# Patient Record
Sex: FEMALE | Race: WHITE | NOT HISPANIC OR LATINO | URBAN - METROPOLITAN AREA
[De-identification: names, ages, dates, MRNs, and addresses within clinical notes are randomized per-mention and may not be internally consistent; named-entity substitution may affect disease eponyms.]

---

## 2017-01-06 ENCOUNTER — EMERGENCY (EMERGENCY)
Facility: HOSPITAL | Age: 42
LOS: 1 days | Discharge: HOME | End: 2017-01-06
Attending: INTERNAL MEDICINE | Admitting: EMERGENCY MEDICINE
Payer: COMMERCIAL

## 2017-01-06 VITALS
RESPIRATION RATE: 16 BRPM | WEIGHT: 156.97 LBS | OXYGEN SATURATION: 100 % | SYSTOLIC BLOOD PRESSURE: 119 MMHG | TEMPERATURE: 98 F | DIASTOLIC BLOOD PRESSURE: 81 MMHG | HEART RATE: 80 BPM | HEIGHT: 65 IN

## 2017-01-06 VITALS
SYSTOLIC BLOOD PRESSURE: 144 MMHG | TEMPERATURE: 98 F | RESPIRATION RATE: 17 BRPM | OXYGEN SATURATION: 98 % | DIASTOLIC BLOOD PRESSURE: 99 MMHG | HEART RATE: 73 BPM

## 2017-01-06 DIAGNOSIS — Z88.5 ALLERGY STATUS TO NARCOTIC AGENT: ICD-10-CM

## 2017-01-06 DIAGNOSIS — M54.5 LOW BACK PAIN: ICD-10-CM

## 2017-01-06 DIAGNOSIS — Z98.89 OTHER SPECIFIED POSTPROCEDURAL STATES: Chronic | ICD-10-CM

## 2017-01-06 DIAGNOSIS — D56.3 THALASSEMIA MINOR: ICD-10-CM

## 2017-01-06 DIAGNOSIS — M54.30 SCIATICA, UNSPECIFIED SIDE: ICD-10-CM

## 2017-01-06 PROCEDURE — 99283 EMERGENCY DEPT VISIT LOW MDM: CPT

## 2017-01-06 PROCEDURE — 99283 EMERGENCY DEPT VISIT LOW MDM: CPT | Mod: 25

## 2017-01-06 RX ADMIN — Medication 60 MILLIGRAM(S): at 01:39

## 2017-01-06 NOTE — ED PROVIDER NOTE - CONSTITUTIONAL, MLM
normal... Well appearing, well nourished, awake, alert, oriented to person, place, time/situation and in mild to moderate distress.

## 2017-01-06 NOTE — ED ADULT NURSE NOTE - NS ED NURSE DC INFO COMPLEXITY
Returned Demonstration/Simple: Patient demonstrates quick and easy understanding/Verbalized Understanding

## 2017-01-06 NOTE — ED ADULT NURSE NOTE - OBJECTIVE STATEMENT
patient states she was working out at the gym a few days ago and starting yesterday she developed pain to left buttock radiating down leg, worse with sitting and laying down and better when walking. No visible injury, patient denies injuring trauma to leg or body.

## 2017-01-06 NOTE — ED ADULT NURSE REASSESSMENT NOTE - NS ED NURSE REASSESS COMMENT FT1
patient states pain is the same but she wants to go home and rest at home while waiting for medication to take effect. Patient able to ambulate without difficulty, does not want any pain meds now, "I will take ibuprofen at home". D/C to home with daughter at this time.

## 2017-01-06 NOTE — ED ADULT TRIAGE NOTE - CHIEF COMPLAINT QUOTE
left lower back pain that radiates down leg since yesterday left lower back pain that radiates down leg since yesterday after working out at the gym

## 2017-01-16 ENCOUNTER — RESULT REVIEW (OUTPATIENT)
Age: 42
End: 2017-01-16

## 2017-09-29 ENCOUNTER — EMERGENCY (EMERGENCY)
Facility: HOSPITAL | Age: 42
LOS: 1 days | Discharge: HOME | End: 2017-09-29
Attending: EMERGENCY MEDICINE | Admitting: EMERGENCY MEDICINE
Payer: COMMERCIAL

## 2017-09-29 VITALS
HEART RATE: 88 BPM | OXYGEN SATURATION: 99 % | TEMPERATURE: 98 F | SYSTOLIC BLOOD PRESSURE: 142 MMHG | DIASTOLIC BLOOD PRESSURE: 89 MMHG | RESPIRATION RATE: 16 BRPM

## 2017-09-29 DIAGNOSIS — Z90.49 ACQUIRED ABSENCE OF OTHER SPECIFIED PARTS OF DIGESTIVE TRACT: Chronic | ICD-10-CM

## 2017-09-29 DIAGNOSIS — Z98.89 OTHER SPECIFIED POSTPROCEDURAL STATES: Chronic | ICD-10-CM

## 2017-09-29 PROCEDURE — 64400 NJX AA&/STRD TRIGEMINAL NRV: CPT | Mod: LT

## 2017-09-29 PROCEDURE — 64400 NJX AA&/STRD TRIGEMINAL NRV: CPT | Mod: 26,LT

## 2017-09-29 PROCEDURE — 99283 EMERGENCY DEPT VISIT LOW MDM: CPT | Mod: 25

## 2017-09-29 RX ORDER — ACETAMINOPHEN 500 MG
650 TABLET ORAL ONCE
Qty: 0 | Refills: 0 | Status: COMPLETED | OUTPATIENT
Start: 2017-09-29 | End: 2017-09-29

## 2017-09-29 RX ADMIN — Medication 650 MILLIGRAM(S): at 01:49

## 2017-09-29 NOTE — ED PROVIDER NOTE - PLAN OF CARE
1) Please follow-up with your primary care doctor within the next 3 days. You may also follow up with the dental clinic ([phone]) if unable to get an earlier appointment with your dentist. Please call today or tomorrow for an appointment.  If you cannot follow-up with your doctor(s), please return to the ED for any urgent issues.  2) If you have any worsening of symptoms or any other concerns please return to the ED immediately.  3) Please continue taking your home medications as directed.

## 2017-09-29 NOTE — ED ADULT NURSE NOTE - CHPI ED SYMPTOMS NEG
no fever/no ear pain/no headache/no bleeding gums/no decreased eating/drinking/no mouth sores/no nasal congestion

## 2017-09-29 NOTE — ED PROCEDURE NOTE - ATTENDING CONTRIBUTION TO CARE
I was physically present for the E/M service provided. I was physically present for the key portions of the service provided. MARCIE.

## 2017-09-29 NOTE — ED PROVIDER NOTE - OBJECTIVE STATEMENT
41y  Female currently 20 weeks pregnant PMH anemia and PSH appendectomy complaining of dental pain/injury. Having pain in the Left upper jaw, planning to get a root canal around 10/24. Has been taking tylenol for pain. No pus. Denies fevers, chills, nausea, vomiting, diarrhea, constipation, chest pain, or dyspnea. Able to swallow without difficulty. 41y  Female currently 20 weeks pregnant PMH anemia and PSH appendectomy complaining of dental pain/injury. Having pain in the Left upper jaw, planning to get a root canal around 10/24. Has been taking tylenol for pain. No pus. Denies fevers, chills, nausea, vomiting, diarrhea, constipation, chest pain, or dyspnea. Able to swallow without difficulty. No obvious abscess. 41y  Female currently 20 weeks pregnant PMH anemia and PSH appendectomy complaining of dental pain/injury. Having pain in the Left upper jaw for the past two days, planning to get a root canal around 10/24. Has been taking tylenol for pain. No pus. Denies fevers, chills, nausea, vomiting, diarrhea, constipation, chest pain, or dyspnea. Able to swallow without difficulty. No obvious abscess. 41y  Female currently 20 weeks pregnant PMH anemia and PSH appendectomy complaining of dental pain/injury. Having pain in the Left upper jaw for the past two days, planning to get a root canal around 10/24. Has been taking tylenol for pain. No pus. Denies fevers, chills, nausea, vomiting, diarrhea, constipation, chest pain, or dyspnea. Able to swallow without difficulty. No obvious abscess. Does not want opioid medications due to side effects (sedation and potential risk to baby).

## 2017-09-29 NOTE — ED PROVIDER NOTE - ATTENDING CONTRIBUTION TO CARE
I was physically present for the E/M service provided. I agree with above history, physical, and plan which I have reviewed and edited where appropriate. I was physically present for the key portions of the service provided.    41y  Female currently 20 weeks pregnant p/w dental pain (#15) has root canal scheduled. Taking Tylenol. No fever. No vaginal bleeding. No pelvic pain. . Afebrile. Tolerates PO. No drooling. Oropharynx clear, no gum fluctuance. Pt does not tolerate opioids. Dental nerve block performed per note.

## 2017-09-29 NOTE — ED PROVIDER NOTE - CARE PLAN
Principal Discharge DX:	Tooth ache  Instructions for follow-up, activity and diet:	1) Please follow-up with your primary care doctor within the next 3 days. You may also follow up with the dental clinic ([phone]) if unable to get an earlier appointment with your dentist. Please call today or tomorrow for an appointment.  If you cannot follow-up with your doctor(s), please return to the ED for any urgent issues.  2) If you have any worsening of symptoms or any other concerns please return to the ED immediately.  3) Please continue taking your home medications as directed.

## 2017-09-29 NOTE — ED PROVIDER NOTE - PHYSICAL EXAMINATION
PE: CONSTITUTIONAL: Well appearing, well nourished, in no apparent distress. ENMT: Airway patent, nasal mucosa clear, mouth with normal mucosa, TTP of left upper jaw tooth #15 (left upper molar), no abscess visualized or palpated. HEAD: NCAT EYES: PERRL, EOMI bilaterally CARDIAC: RRR, no m/r/g, no pedal edema RESPIRATORY: CTA bilaterally, no adventitious sounds GI: Abdomen non-distended, non-tender, +gravid uterus MSK: Spine appears normal, range of motion is not limited, no muscle/joint tenderness NEURO: CNII-XII grossly intact, 5/5 strength, full sensation all extremities, gait intact SKIN: Skin tone normal in color, warm and dry. No evidence of rash.

## 2017-09-29 NOTE — ED ADULT NURSE NOTE - OBJECTIVE STATEMENT
41 year old female presents to the ED complaining of left upper jaw pain. As per patient she has a root canal scheduled for 10/24 and has been taking tylenol for the pain. Pt is 20 weeks pregnant . Pt is A&O x 4, VSS, afebrile, ambulating independently. Pt denies fever, chills, NVD, SOB, or chest pain. Bed in low position,  at bedside.

## 2017-09-29 NOTE — ED PROVIDER NOTE - PROGRESS NOTE DETAILS
FHR of 160bpm by doppler.  - Luis A Zhang MD Patient feeling improved, will discharge home.  - Luis A Zhang MD

## 2017-10-06 ENCOUNTER — ASOB RESULT (OUTPATIENT)
Age: 42
End: 2017-10-06

## 2017-10-06 ENCOUNTER — APPOINTMENT (OUTPATIENT)
Dept: ANTEPARTUM | Facility: CLINIC | Age: 42
End: 2017-10-06
Payer: COMMERCIAL

## 2017-10-06 PROCEDURE — 76811 OB US DETAILED SNGL FETUS: CPT

## 2017-12-01 ENCOUNTER — APPOINTMENT (OUTPATIENT)
Dept: ANTEPARTUM | Facility: CLINIC | Age: 42
End: 2017-12-01
Payer: COMMERCIAL

## 2017-12-01 ENCOUNTER — ASOB RESULT (OUTPATIENT)
Age: 42
End: 2017-12-01

## 2017-12-01 ENCOUNTER — OUTPATIENT (OUTPATIENT)
Dept: OUTPATIENT SERVICES | Facility: HOSPITAL | Age: 42
LOS: 1 days | End: 2017-12-01
Payer: COMMERCIAL

## 2017-12-01 DIAGNOSIS — Z01.818 ENCOUNTER FOR OTHER PREPROCEDURAL EXAMINATION: ICD-10-CM

## 2017-12-01 DIAGNOSIS — O09.293 SUPERVISION OF PREGNANCY WITH OTHER POOR REPRODUCTIVE OR OBSTETRIC HISTORY, THIRD TRIMESTER: ICD-10-CM

## 2017-12-01 DIAGNOSIS — Z90.49 ACQUIRED ABSENCE OF OTHER SPECIFIED PARTS OF DIGESTIVE TRACT: Chronic | ICD-10-CM

## 2017-12-01 DIAGNOSIS — O09.523 SUPERVISION OF ELDERLY MULTIGRAVIDA, THIRD TRIMESTER: ICD-10-CM

## 2017-12-01 DIAGNOSIS — D56.3 THALASSEMIA MINOR: ICD-10-CM

## 2017-12-01 DIAGNOSIS — O34.13 MATERNAL CARE FOR BENIGN TUMOR OF CORPUS UTERI, THIRD TRIMESTER: ICD-10-CM

## 2017-12-01 DIAGNOSIS — Z98.89 OTHER SPECIFIED POSTPROCEDURAL STATES: Chronic | ICD-10-CM

## 2017-12-01 DIAGNOSIS — O44.43 LOW LYING PLACENTA NOS OR WITHOUT HEMORRHAGE, THIRD TRIMESTER: ICD-10-CM

## 2017-12-01 PROCEDURE — 76816 OB US FOLLOW-UP PER FETUS: CPT | Mod: 26

## 2017-12-01 PROCEDURE — 59025 FETAL NON-STRESS TEST: CPT | Mod: 26

## 2017-12-01 PROCEDURE — 59025 FETAL NON-STRESS TEST: CPT

## 2017-12-01 PROCEDURE — 76816 OB US FOLLOW-UP PER FETUS: CPT

## 2017-12-28 ENCOUNTER — ASOB RESULT (OUTPATIENT)
Age: 42
End: 2017-12-28

## 2017-12-28 ENCOUNTER — APPOINTMENT (OUTPATIENT)
Dept: ANTEPARTUM | Facility: CLINIC | Age: 42
End: 2017-12-28
Payer: COMMERCIAL

## 2017-12-28 PROCEDURE — 76816 OB US FOLLOW-UP PER FETUS: CPT

## 2017-12-28 PROCEDURE — 76817 TRANSVAGINAL US OBSTETRIC: CPT

## 2018-01-11 ENCOUNTER — APPOINTMENT (OUTPATIENT)
Dept: ANTEPARTUM | Facility: CLINIC | Age: 43
End: 2018-01-11
Payer: COMMERCIAL

## 2018-01-11 ENCOUNTER — ASOB RESULT (OUTPATIENT)
Age: 43
End: 2018-01-11

## 2018-01-11 PROCEDURE — 76817 TRANSVAGINAL US OBSTETRIC: CPT

## 2018-01-11 PROCEDURE — 76816 OB US FOLLOW-UP PER FETUS: CPT

## 2018-01-12 ENCOUNTER — RESULT REVIEW (OUTPATIENT)
Age: 43
End: 2018-01-12

## 2018-01-13 ENCOUNTER — INPATIENT (INPATIENT)
Facility: HOSPITAL | Age: 43
LOS: 2 days | Discharge: HOME | End: 2018-01-16
Attending: SPECIALIST | Admitting: SPECIALIST
Payer: COMMERCIAL

## 2018-01-13 ENCOUNTER — EMERGENCY (EMERGENCY)
Facility: HOSPITAL | Age: 43
LOS: 1 days | Discharge: STILL ADMITTED OR ADMINISTRATIVE | End: 2018-01-13
Admitting: EMERGENCY MEDICINE

## 2018-01-13 ENCOUNTER — TRANSCRIPTION ENCOUNTER (OUTPATIENT)
Age: 43
End: 2018-01-13

## 2018-01-13 VITALS — WEIGHT: 171.96 LBS | HEIGHT: 65 IN

## 2018-01-13 VITALS
TEMPERATURE: 98 F | DIASTOLIC BLOOD PRESSURE: 84 MMHG | RESPIRATION RATE: 16 BRPM | HEART RATE: 95 BPM | SYSTOLIC BLOOD PRESSURE: 156 MMHG | OXYGEN SATURATION: 100 %

## 2018-01-13 DIAGNOSIS — Z98.89 OTHER SPECIFIED POSTPROCEDURAL STATES: Chronic | ICD-10-CM

## 2018-01-13 DIAGNOSIS — O26.899 OTHER SPECIFIED PREGNANCY RELATED CONDITIONS, UNSPECIFIED TRIMESTER: ICD-10-CM

## 2018-01-13 DIAGNOSIS — Z90.49 ACQUIRED ABSENCE OF OTHER SPECIFIED PARTS OF DIGESTIVE TRACT: Chronic | ICD-10-CM

## 2018-01-13 LAB
ALBUMIN SERPL ELPH-MCNC: 3.7 G/DL — SIGNIFICANT CHANGE UP (ref 3.3–5)
ALP SERPL-CCNC: 123 U/L — HIGH (ref 40–120)
ALT FLD-CCNC: 13 U/L — SIGNIFICANT CHANGE UP (ref 4–33)
ANISOCYTOSIS BLD QL: SLIGHT — SIGNIFICANT CHANGE UP
APPEARANCE UR: CLEAR — SIGNIFICANT CHANGE UP
APTT BLD: 34.3 SEC — SIGNIFICANT CHANGE UP (ref 27.5–37.4)
AST SERPL-CCNC: 16 U/L — SIGNIFICANT CHANGE UP (ref 4–32)
BACTERIA # UR AUTO: SIGNIFICANT CHANGE UP
BASOPHILS # BLD AUTO: 0.02 K/UL — SIGNIFICANT CHANGE UP (ref 0–0.2)
BASOPHILS NFR BLD AUTO: 0.3 % — SIGNIFICANT CHANGE UP (ref 0–2)
BILIRUB SERPL-MCNC: 0.3 MG/DL — SIGNIFICANT CHANGE UP (ref 0.2–1.2)
BILIRUB UR-MCNC: NEGATIVE — SIGNIFICANT CHANGE UP
BLD GP AB SCN SERPL QL: NEGATIVE — SIGNIFICANT CHANGE UP
BLOOD UR QL VISUAL: NEGATIVE — SIGNIFICANT CHANGE UP
BUN SERPL-MCNC: 6 MG/DL — LOW (ref 7–23)
BURR CELLS BLD QL SMEAR: PRESENT — SIGNIFICANT CHANGE UP
CALCIUM SERPL-MCNC: 8.9 MG/DL — SIGNIFICANT CHANGE UP (ref 8.4–10.5)
CHLORIDE SERPL-SCNC: 104 MMOL/L — SIGNIFICANT CHANGE UP (ref 98–107)
CO2 SERPL-SCNC: 21 MMOL/L — LOW (ref 22–31)
COLOR SPEC: YELLOW — SIGNIFICANT CHANGE UP
CREAT SERPL-MCNC: 0.45 MG/DL — LOW (ref 0.5–1.3)
DACRYOCYTES BLD QL SMEAR: SLIGHT — SIGNIFICANT CHANGE UP
EOSINOPHIL # BLD AUTO: 0.13 K/UL — SIGNIFICANT CHANGE UP (ref 0–0.5)
EOSINOPHIL NFR BLD AUTO: 1.8 % — SIGNIFICANT CHANGE UP (ref 0–6)
FIBRINOGEN PPP-MCNC: 605 MG/DL — HIGH (ref 310–510)
GIANT PLATELETS BLD QL SMEAR: PRESENT — SIGNIFICANT CHANGE UP
GLUCOSE SERPL-MCNC: 117 MG/DL — HIGH (ref 70–99)
GLUCOSE UR-MCNC: NEGATIVE — SIGNIFICANT CHANGE UP
HBV SURFACE AG SER-ACNC: NEGATIVE — SIGNIFICANT CHANGE UP
HCT VFR BLD CALC: 23.5 % — LOW (ref 34.5–45)
HCT VFR BLD CALC: 26.1 % — LOW (ref 34.5–45)
HCT VFR BLD CALC: 28.5 % — LOW (ref 34.5–45)
HGB BLD-MCNC: 7.4 G/DL — LOW (ref 11.5–15.5)
HGB BLD-MCNC: 8.5 G/DL — LOW (ref 11.5–15.5)
HGB BLD-MCNC: 9.5 G/DL — LOW (ref 11.5–15.5)
HYALINE CASTS # UR AUTO: SIGNIFICANT CHANGE UP (ref 0–?)
IMM GRANULOCYTES # BLD AUTO: 0.12 # — SIGNIFICANT CHANGE UP
IMM GRANULOCYTES NFR BLD AUTO: 1.6 % — HIGH (ref 0–1.5)
INR BLD: 0.96 — SIGNIFICANT CHANGE UP (ref 0.88–1.17)
KETONES UR-MCNC: NEGATIVE — SIGNIFICANT CHANGE UP
LDH SERPL L TO P-CCNC: 170 U/L — SIGNIFICANT CHANGE UP (ref 135–225)
LEUKOCYTE ESTERASE UR-ACNC: HIGH
LYMPHOCYTES # BLD AUTO: 1.01 K/UL — SIGNIFICANT CHANGE UP (ref 1–3.3)
LYMPHOCYTES # BLD AUTO: 13.6 % — SIGNIFICANT CHANGE UP (ref 13–44)
MACROCYTES BLD QL: SLIGHT — SIGNIFICANT CHANGE UP
MANUAL SMEAR VERIFICATION: SIGNIFICANT CHANGE UP
MCHC RBC-ENTMCNC: 31.5 % — LOW (ref 32–36)
MCHC RBC-ENTMCNC: 31.7 PG — SIGNIFICANT CHANGE UP (ref 27–34)
MCHC RBC-ENTMCNC: 31.9 PG — SIGNIFICANT CHANGE UP (ref 27–34)
MCHC RBC-ENTMCNC: 32.6 % — SIGNIFICANT CHANGE UP (ref 32–36)
MCHC RBC-ENTMCNC: 32.6 PG — SIGNIFICANT CHANGE UP (ref 27–34)
MCHC RBC-ENTMCNC: 33.3 % — SIGNIFICANT CHANGE UP (ref 32–36)
MCV RBC AUTO: 101.3 FL — HIGH (ref 80–100)
MCV RBC AUTO: 97.4 FL — SIGNIFICANT CHANGE UP (ref 80–100)
MCV RBC AUTO: 97.9 FL — SIGNIFICANT CHANGE UP (ref 80–100)
MONOCYTES # BLD AUTO: 0.51 K/UL — SIGNIFICANT CHANGE UP (ref 0–0.9)
MONOCYTES NFR BLD AUTO: 6.9 % — SIGNIFICANT CHANGE UP (ref 2–14)
MUCOUS THREADS # UR AUTO: SIGNIFICANT CHANGE UP
NEUTROPHILS # BLD AUTO: 5.61 K/UL — SIGNIFICANT CHANGE UP (ref 1.8–7.4)
NEUTROPHILS NFR BLD AUTO: 75.8 % — SIGNIFICANT CHANGE UP (ref 43–77)
NITRITE UR-MCNC: NEGATIVE — SIGNIFICANT CHANGE UP
NON-SQ EPI CELLS # UR AUTO: <1 — SIGNIFICANT CHANGE UP
NRBC # FLD: 0 — SIGNIFICANT CHANGE UP
PH UR: 6.5 — SIGNIFICANT CHANGE UP (ref 4.6–8)
PLATELET # BLD AUTO: 100 K/UL — LOW (ref 150–400)
PLATELET # BLD AUTO: 134 K/UL — LOW (ref 150–400)
PLATELET # BLD AUTO: 98 K/UL — LOW (ref 150–400)
PLATELET COUNT - ESTIMATE: SIGNIFICANT CHANGE UP
PMV BLD: 9.4 FL — SIGNIFICANT CHANGE UP (ref 7–13)
PMV BLD: 9.4 FL — SIGNIFICANT CHANGE UP (ref 7–13)
PMV BLD: 9.7 FL — SIGNIFICANT CHANGE UP (ref 7–13)
POLYCHROMASIA BLD QL SMEAR: SLIGHT — SIGNIFICANT CHANGE UP
POTASSIUM SERPL-MCNC: 4.1 MMOL/L — SIGNIFICANT CHANGE UP (ref 3.5–5.3)
POTASSIUM SERPL-SCNC: 4.1 MMOL/L — SIGNIFICANT CHANGE UP (ref 3.5–5.3)
PROT SERPL-MCNC: 6.8 G/DL — SIGNIFICANT CHANGE UP (ref 6–8.3)
PROT UR-MCNC: 10 MG/DL — SIGNIFICANT CHANGE UP
PROTHROM AB SERPL-ACNC: 10.6 SEC — SIGNIFICANT CHANGE UP (ref 9.8–13.1)
RBC # BLD: 2.32 M/UL — LOW (ref 3.8–5.2)
RBC # BLD: 2.68 M/UL — LOW (ref 3.8–5.2)
RBC # BLD: 2.91 M/UL — LOW (ref 3.8–5.2)
RBC # FLD: 14.4 % — SIGNIFICANT CHANGE UP (ref 10.3–14.5)
RBC CASTS # UR COMP ASSIST: SIGNIFICANT CHANGE UP (ref 0–?)
RH IG SCN BLD-IMP: POSITIVE — SIGNIFICANT CHANGE UP
RUBV IGG SER-ACNC: 10.6 INDEX — SIGNIFICANT CHANGE UP
RUBV IGG SER-IMP: POSITIVE — SIGNIFICANT CHANGE UP
SODIUM SERPL-SCNC: 139 MMOL/L — SIGNIFICANT CHANGE UP (ref 135–145)
SP GR SPEC: 1.02 — SIGNIFICANT CHANGE UP (ref 1–1.04)
SQUAMOUS # UR AUTO: SIGNIFICANT CHANGE UP
T PALLIDUM AB TITR SER: NEGATIVE — SIGNIFICANT CHANGE UP
URATE SERPL-MCNC: 3.9 MG/DL — SIGNIFICANT CHANGE UP (ref 2.5–7)
UROBILINOGEN FLD QL: NORMAL MG/DL — SIGNIFICANT CHANGE UP
WBC # BLD: 7.4 K/UL — SIGNIFICANT CHANGE UP (ref 3.8–10.5)
WBC # BLD: 8.96 K/UL — SIGNIFICANT CHANGE UP (ref 3.8–10.5)
WBC # BLD: 9.38 K/UL — SIGNIFICANT CHANGE UP (ref 3.8–10.5)
WBC # FLD AUTO: 7.4 K/UL — SIGNIFICANT CHANGE UP (ref 3.8–10.5)
WBC # FLD AUTO: 8.96 K/UL — SIGNIFICANT CHANGE UP (ref 3.8–10.5)
WBC # FLD AUTO: 9.38 K/UL — SIGNIFICANT CHANGE UP (ref 3.8–10.5)
WBC UR QL: SIGNIFICANT CHANGE UP (ref 0–?)

## 2018-01-13 PROCEDURE — 88307 TISSUE EXAM BY PATHOLOGIST: CPT | Mod: 26

## 2018-01-13 RX ORDER — SODIUM CHLORIDE 9 MG/ML
1000 INJECTION, SOLUTION INTRAVENOUS ONCE
Qty: 0 | Refills: 0 | Status: COMPLETED | OUTPATIENT
Start: 2018-01-13 | End: 2018-01-13

## 2018-01-13 RX ORDER — DOCUSATE SODIUM 100 MG
100 CAPSULE ORAL
Qty: 0 | Refills: 0 | Status: DISCONTINUED | OUTPATIENT
Start: 2018-01-13 | End: 2018-01-15

## 2018-01-13 RX ORDER — TETANUS TOXOID, REDUCED DIPHTHERIA TOXOID AND ACELLULAR PERTUSSIS VACCINE, ADSORBED 5; 2.5; 8; 8; 2.5 [IU]/.5ML; [IU]/.5ML; UG/.5ML; UG/.5ML; UG/.5ML
0.5 SUSPENSION INTRAMUSCULAR ONCE
Qty: 0 | Refills: 0 | Status: COMPLETED | OUTPATIENT
Start: 2018-01-13

## 2018-01-13 RX ORDER — HEPARIN SODIUM 5000 [USP'U]/ML
5000 INJECTION INTRAVENOUS; SUBCUTANEOUS EVERY 12 HOURS
Qty: 0 | Refills: 0 | Status: DISCONTINUED | OUTPATIENT
Start: 2018-01-13 | End: 2018-01-16

## 2018-01-13 RX ORDER — GLYCERIN ADULT
1 SUPPOSITORY, RECTAL RECTAL AT BEDTIME
Qty: 0 | Refills: 0 | Status: DISCONTINUED | OUTPATIENT
Start: 2018-01-13 | End: 2018-01-16

## 2018-01-13 RX ORDER — OXYTOCIN 10 UNIT/ML
333.33 VIAL (ML) INJECTION
Qty: 20 | Refills: 0 | Status: COMPLETED | OUTPATIENT
Start: 2018-01-13 | End: 2018-01-13

## 2018-01-13 RX ORDER — SODIUM CHLORIDE 9 MG/ML
500 INJECTION, SOLUTION INTRAVENOUS ONCE
Qty: 0 | Refills: 0 | Status: COMPLETED | OUTPATIENT
Start: 2018-01-13 | End: 2018-01-13

## 2018-01-13 RX ORDER — IBUPROFEN 200 MG
1 TABLET ORAL
Qty: 0 | Refills: 0 | COMMUNITY
Start: 2018-01-13

## 2018-01-13 RX ORDER — FERROUS SULFATE 325(65) MG
325 TABLET ORAL DAILY
Qty: 0 | Refills: 0 | Status: DISCONTINUED | OUTPATIENT
Start: 2018-01-13 | End: 2018-01-15

## 2018-01-13 RX ORDER — OXYCODONE HYDROCHLORIDE 5 MG/1
5 TABLET ORAL
Qty: 0 | Refills: 0 | Status: DISCONTINUED | OUTPATIENT
Start: 2018-01-13 | End: 2018-01-13

## 2018-01-13 RX ORDER — SODIUM CHLORIDE 9 MG/ML
1000 INJECTION, SOLUTION INTRAVENOUS
Qty: 0 | Refills: 0 | Status: DISCONTINUED | OUTPATIENT
Start: 2018-01-13 | End: 2018-01-14

## 2018-01-13 RX ORDER — DIPHENHYDRAMINE HCL 50 MG
25 CAPSULE ORAL EVERY 6 HOURS
Qty: 0 | Refills: 0 | Status: DISCONTINUED | OUTPATIENT
Start: 2018-01-13 | End: 2018-01-16

## 2018-01-13 RX ORDER — ACETAMINOPHEN 500 MG
3 TABLET ORAL
Qty: 0 | Refills: 0 | COMMUNITY
Start: 2018-01-13

## 2018-01-13 RX ORDER — INFLUENZA VIRUS VACCINE 15; 15; 15; 15 UG/.5ML; UG/.5ML; UG/.5ML; UG/.5ML
0.5 SUSPENSION INTRAMUSCULAR ONCE
Qty: 0 | Refills: 0 | Status: DISCONTINUED | OUTPATIENT
Start: 2018-01-13 | End: 2018-01-16

## 2018-01-13 RX ORDER — SODIUM CHLORIDE 9 MG/ML
1000 INJECTION, SOLUTION INTRAVENOUS
Qty: 0 | Refills: 0 | Status: DISCONTINUED | OUTPATIENT
Start: 2018-01-13 | End: 2018-01-13

## 2018-01-13 RX ORDER — ONDANSETRON 8 MG/1
4 TABLET, FILM COATED ORAL ONCE
Qty: 0 | Refills: 0 | Status: COMPLETED | OUTPATIENT
Start: 2018-01-13 | End: 2018-01-13

## 2018-01-13 RX ORDER — ACETAMINOPHEN 500 MG
975 TABLET ORAL EVERY 6 HOURS
Qty: 0 | Refills: 0 | Status: DISCONTINUED | OUTPATIENT
Start: 2018-01-13 | End: 2018-01-16

## 2018-01-13 RX ORDER — KETOROLAC TROMETHAMINE 30 MG/ML
30 SYRINGE (ML) INJECTION EVERY 6 HOURS
Qty: 0 | Refills: 0 | Status: DISCONTINUED | OUTPATIENT
Start: 2018-01-13 | End: 2018-01-14

## 2018-01-13 RX ORDER — LANOLIN
1 OINTMENT (GRAM) TOPICAL
Qty: 0 | Refills: 0 | Status: DISCONTINUED | OUTPATIENT
Start: 2018-01-13 | End: 2018-01-16

## 2018-01-13 RX ORDER — ACETAMINOPHEN 500 MG
3 TABLET ORAL
Qty: 0 | Refills: 0 | DISCHARGE
Start: 2018-01-13

## 2018-01-13 RX ORDER — OXYTOCIN 10 UNIT/ML
41.67 VIAL (ML) INJECTION
Qty: 20 | Refills: 0 | Status: DISCONTINUED | OUTPATIENT
Start: 2018-01-13 | End: 2018-01-13

## 2018-01-13 RX ORDER — FAMOTIDINE 10 MG/ML
20 INJECTION INTRAVENOUS ONCE
Qty: 0 | Refills: 0 | Status: COMPLETED | OUTPATIENT
Start: 2018-01-13 | End: 2018-01-13

## 2018-01-13 RX ORDER — CITRIC ACID/SODIUM CITRATE 300-500 MG
30 SOLUTION, ORAL ORAL ONCE
Qty: 0 | Refills: 0 | Status: COMPLETED | OUTPATIENT
Start: 2018-01-13 | End: 2018-01-13

## 2018-01-13 RX ORDER — SIMETHICONE 80 MG/1
80 TABLET, CHEWABLE ORAL EVERY 4 HOURS
Qty: 0 | Refills: 0 | Status: DISCONTINUED | OUTPATIENT
Start: 2018-01-13 | End: 2018-01-16

## 2018-01-13 RX ORDER — OXYCODONE HYDROCHLORIDE 5 MG/1
5 TABLET ORAL EVERY 4 HOURS
Qty: 0 | Refills: 0 | Status: DISCONTINUED | OUTPATIENT
Start: 2018-01-13 | End: 2018-01-13

## 2018-01-13 RX ORDER — METOCLOPRAMIDE HCL 10 MG
10 TABLET ORAL ONCE
Qty: 0 | Refills: 0 | Status: COMPLETED | OUTPATIENT
Start: 2018-01-13 | End: 2018-01-13

## 2018-01-13 RX ORDER — IBUPROFEN 200 MG
600 TABLET ORAL EVERY 6 HOURS
Qty: 0 | Refills: 0 | Status: COMPLETED | OUTPATIENT
Start: 2018-01-13 | End: 2018-12-12

## 2018-01-13 RX ADMIN — Medication 30 MILLIGRAM(S): at 14:42

## 2018-01-13 RX ADMIN — SODIUM CHLORIDE 1000 MILLILITER(S): 9 INJECTION, SOLUTION INTRAVENOUS at 14:01

## 2018-01-13 RX ADMIN — SODIUM CHLORIDE 125 MILLILITER(S): 9 INJECTION, SOLUTION INTRAVENOUS at 12:16

## 2018-01-13 RX ADMIN — SODIUM CHLORIDE 125 MILLILITER(S): 9 INJECTION, SOLUTION INTRAVENOUS at 02:35

## 2018-01-13 RX ADMIN — Medication 30 MILLIGRAM(S): at 20:30

## 2018-01-13 RX ADMIN — HEPARIN SODIUM 5000 UNIT(S): 5000 INJECTION INTRAVENOUS; SUBCUTANEOUS at 13:05

## 2018-01-13 RX ADMIN — Medication 125 MILLIUNIT(S)/MIN: at 05:08

## 2018-01-13 RX ADMIN — Medication 12 MILLIGRAM(S): at 02:35

## 2018-01-13 RX ADMIN — Medication 1000 MILLIUNIT(S)/MIN: at 05:08

## 2018-01-13 RX ADMIN — SODIUM CHLORIDE 2000 MILLILITER(S): 9 INJECTION, SOLUTION INTRAVENOUS at 02:35

## 2018-01-13 RX ADMIN — Medication 30 MILLILITER(S): at 03:20

## 2018-01-13 RX ADMIN — FAMOTIDINE 20 MILLIGRAM(S): 10 INJECTION INTRAVENOUS at 02:35

## 2018-01-13 RX ADMIN — ONDANSETRON 4 MILLIGRAM(S): 8 TABLET, FILM COATED ORAL at 09:30

## 2018-01-13 RX ADMIN — Medication 30 MILLIGRAM(S): at 21:00

## 2018-01-13 RX ADMIN — Medication 975 MILLIGRAM(S): at 13:47

## 2018-01-13 RX ADMIN — SODIUM CHLORIDE 2000 MILLILITER(S): 9 INJECTION, SOLUTION INTRAVENOUS at 11:10

## 2018-01-13 RX ADMIN — Medication 125 MILLIUNIT(S)/MIN: at 05:28

## 2018-01-13 RX ADMIN — Medication 10 MILLIGRAM(S): at 02:35

## 2018-01-13 RX ADMIN — Medication 975 MILLIGRAM(S): at 14:42

## 2018-01-13 RX ADMIN — ONDANSETRON 4 MILLIGRAM(S): 8 TABLET, FILM COATED ORAL at 17:38

## 2018-01-13 RX ADMIN — Medication 125 MILLIUNIT(S)/MIN: at 05:27

## 2018-01-13 RX ADMIN — Medication 30 MILLIGRAM(S): at 15:15

## 2018-01-13 NOTE — DISCHARGE NOTE OB - CARE PROVIDER_API CALL
José Miguel Payne), Obstetrics and Gynecology  [address] [place] [address]  Phone: [phone]  Fax: [phone]

## 2018-01-13 NOTE — PROVIDER CONTACT NOTE (OTHER) - BACKGROUND
Pt s/p c/s for Placenta Previa. Urine output for 9am hour 30. OB Resident notified. Monitoring continued. Adequate output for pt is 39mL/hr. Next hour, Urine output was 10. OB Resident notifed.

## 2018-01-13 NOTE — DISCHARGE NOTE OB - CARE PLAN
Principal Discharge DX:	 delivery delivered  Goal:	RECOVERY  Instructions for follow-up, activity and diet:	REGULAR DIET  AMBULATION ENCOURAGED Principal Discharge DX:	 delivery delivered  Goal:	RECOVERY  Assessment and plan of treatment:	REGULAR DIET  AMBULATION ENCOURAGED

## 2018-01-13 NOTE — DISCHARGE NOTE OB - MEDICATION SUMMARY - MEDICATIONS TO STOP TAKING
I will STOP taking the medications listed below when I get home from the hospital:  None I will STOP taking the medications listed below when I get home from the hospital:    Medrol Dosepak 4 mg oral tablet  -- 1 tab(s) by mouth once a day  Use as directed  -- It is very important that you take or use this exactly as directed.  Do not skip doses or discontinue unless directed by your doctor.  Obtain medical advice before taking any non-prescription drugs as some may affect the action of this medication.  Take with food or milk.    Naprosyn 500 mg oral tablet  -- 1 tab(s) by mouth 2 times a day  -- Check with your doctor before becoming pregnant.  May cause drowsiness or dizziness.  Obtain medical advice before taking any non-prescription drugs as some may affect the action of this medication.  Take with food or milk.    Medrol Dosepak 4 mg oral tablet  -- 1 dose(s) by mouth once a day  -- It is very important that you take or use this exactly as directed.  Do not skip doses or discontinue unless directed by your doctor.  Obtain medical advice before taking any non-prescription drugs as some may affect the action of this medication.  Take with food or milk.

## 2018-01-13 NOTE — DISCHARGE NOTE OB - PATIENT PORTAL LINK FT
“You can access the FollowHealth Patient Portal, offered by [facility], by registering with the following website: [email or web address]”

## 2018-01-13 NOTE — ED ADULT TRIAGE NOTE - CHIEF COMPLAINT QUOTE
Pt 34 weeks pregnant spotting for the past two months with vaginal pain and pressure in the abdomen since 8pm associated with tightening, pt fourth pregnancy  scheduled for a   secondary to "placenta previa" with dr Elmer sequeira&JARED called, pt going to L&D

## 2018-01-13 NOTE — DISCHARGE NOTE OB - MEDICATION SUMMARY - MEDICATIONS TO TAKE
I will START or STAY ON the medications listed below when I get home from the hospital:    acetaminophen 325 mg oral tablet  -- 3 tab(s) by mouth every 6 hours  -- Indication: For  delivery delivered    ibuprofen 600 mg oral tablet  -- 1 tab(s) by mouth every 6 hours  -- Indication: For  delivery delivered    Iron Chews 15 mg oral tablet, chewable  -- 1 tab(s) by mouth once a day  -- Indication: For Other pregnancy-related conditions, antepartum I will START or STAY ON the medications listed below when I get home from the hospital:    acetaminophen 325 mg oral tablet  -- 3 tab(s) by mouth every 6 hours, As Needed  -- Indication: For moderate pain    Iron Chews 15 mg oral tablet, chewable  -- 1 tab(s) by mouth once a day  -- Indication: For anemia

## 2018-01-14 ENCOUNTER — TRANSCRIPTION ENCOUNTER (OUTPATIENT)
Age: 43
End: 2018-01-14

## 2018-01-14 LAB
BASOPHILS # BLD AUTO: 0.01 K/UL — SIGNIFICANT CHANGE UP (ref 0–0.2)
BASOPHILS NFR BLD AUTO: 0.1 % — SIGNIFICANT CHANGE UP (ref 0–2)
EOSINOPHIL # BLD AUTO: 0.02 K/UL — SIGNIFICANT CHANGE UP (ref 0–0.5)
EOSINOPHIL NFR BLD AUTO: 0.2 % — SIGNIFICANT CHANGE UP (ref 0–6)
HCT VFR BLD CALC: 20 % — CRITICAL LOW (ref 34.5–45)
HCT VFR BLD CALC: 24.1 % — LOW (ref 34.5–45)
HGB BLD-MCNC: 6.6 G/DL — CRITICAL LOW (ref 11.5–15.5)
HGB BLD-MCNC: 7.7 G/DL — LOW (ref 11.5–15.5)
IMM GRANULOCYTES # BLD AUTO: 0.06 # — SIGNIFICANT CHANGE UP
IMM GRANULOCYTES NFR BLD AUTO: 0.7 % — SIGNIFICANT CHANGE UP (ref 0–1.5)
LYMPHOCYTES # BLD AUTO: 0.67 K/UL — LOW (ref 1–3.3)
LYMPHOCYTES # BLD AUTO: 8.2 % — LOW (ref 13–44)
MCHC RBC-ENTMCNC: 31.4 PG — SIGNIFICANT CHANGE UP (ref 27–34)
MCHC RBC-ENTMCNC: 32 % — SIGNIFICANT CHANGE UP (ref 32–36)
MCHC RBC-ENTMCNC: 32.8 PG — SIGNIFICANT CHANGE UP (ref 27–34)
MCHC RBC-ENTMCNC: 33 % — SIGNIFICANT CHANGE UP (ref 32–36)
MCV RBC AUTO: 98.4 FL — SIGNIFICANT CHANGE UP (ref 80–100)
MCV RBC AUTO: 99.5 FL — SIGNIFICANT CHANGE UP (ref 80–100)
MONOCYTES # BLD AUTO: 0.55 K/UL — SIGNIFICANT CHANGE UP (ref 0–0.9)
MONOCYTES NFR BLD AUTO: 6.7 % — SIGNIFICANT CHANGE UP (ref 2–14)
NEUTROPHILS # BLD AUTO: 6.89 K/UL — SIGNIFICANT CHANGE UP (ref 1.8–7.4)
NEUTROPHILS NFR BLD AUTO: 84.1 % — HIGH (ref 43–77)
NRBC # FLD: 0 — SIGNIFICANT CHANGE UP
NRBC # FLD: 0.02 — SIGNIFICANT CHANGE UP
PLATELET # BLD AUTO: 112 K/UL — LOW (ref 150–400)
PLATELET # BLD AUTO: 115 K/UL — LOW (ref 150–400)
PMV BLD: 10.1 FL — SIGNIFICANT CHANGE UP (ref 7–13)
PMV BLD: 9.4 FL — SIGNIFICANT CHANGE UP (ref 7–13)
RBC # BLD: 2.01 M/UL — LOW (ref 3.8–5.2)
RBC # BLD: 2.45 M/UL — LOW (ref 3.8–5.2)
RBC # FLD: 14.5 % — SIGNIFICANT CHANGE UP (ref 10.3–14.5)
RBC # FLD: 14.6 % — HIGH (ref 10.3–14.5)
WBC # BLD: 8.07 K/UL — SIGNIFICANT CHANGE UP (ref 3.8–10.5)
WBC # BLD: 8.2 K/UL — SIGNIFICANT CHANGE UP (ref 3.8–10.5)
WBC # FLD AUTO: 8.07 K/UL — SIGNIFICANT CHANGE UP (ref 3.8–10.5)
WBC # FLD AUTO: 8.2 K/UL — SIGNIFICANT CHANGE UP (ref 3.8–10.5)

## 2018-01-14 RX ORDER — IBUPROFEN 200 MG
600 TABLET ORAL EVERY 6 HOURS
Qty: 0 | Refills: 0 | Status: DISCONTINUED | OUTPATIENT
Start: 2018-01-14 | End: 2018-01-16

## 2018-01-14 RX ADMIN — Medication 600 MILLIGRAM(S): at 19:00

## 2018-01-14 RX ADMIN — SIMETHICONE 80 MILLIGRAM(S): 80 TABLET, CHEWABLE ORAL at 13:49

## 2018-01-14 RX ADMIN — Medication 30 MILLIGRAM(S): at 05:33

## 2018-01-14 RX ADMIN — Medication 975 MILLIGRAM(S): at 13:35

## 2018-01-14 RX ADMIN — Medication 325 MILLIGRAM(S): at 13:35

## 2018-01-14 RX ADMIN — Medication 100 MILLIGRAM(S): at 13:49

## 2018-01-14 RX ADMIN — SIMETHICONE 80 MILLIGRAM(S): 80 TABLET, CHEWABLE ORAL at 05:33

## 2018-01-14 RX ADMIN — Medication 975 MILLIGRAM(S): at 19:00

## 2018-01-14 RX ADMIN — Medication 975 MILLIGRAM(S): at 18:34

## 2018-01-14 RX ADMIN — Medication 975 MILLIGRAM(S): at 14:00

## 2018-01-14 RX ADMIN — HEPARIN SODIUM 5000 UNIT(S): 5000 INJECTION INTRAVENOUS; SUBCUTANEOUS at 13:35

## 2018-01-14 RX ADMIN — Medication 975 MILLIGRAM(S): at 00:36

## 2018-01-14 RX ADMIN — Medication 975 MILLIGRAM(S): at 01:05

## 2018-01-14 RX ADMIN — Medication 1 TABLET(S): at 13:35

## 2018-01-14 RX ADMIN — Medication 100 MILLIGRAM(S): at 05:32

## 2018-01-14 RX ADMIN — Medication 600 MILLIGRAM(S): at 13:35

## 2018-01-14 RX ADMIN — Medication 600 MILLIGRAM(S): at 14:00

## 2018-01-14 RX ADMIN — Medication 600 MILLIGRAM(S): at 18:34

## 2018-01-14 RX ADMIN — Medication 975 MILLIGRAM(S): at 05:32

## 2018-01-14 RX ADMIN — Medication 30 MILLIGRAM(S): at 06:00

## 2018-01-14 RX ADMIN — SIMETHICONE 80 MILLIGRAM(S): 80 TABLET, CHEWABLE ORAL at 18:34

## 2018-01-14 RX ADMIN — SIMETHICONE 80 MILLIGRAM(S): 80 TABLET, CHEWABLE ORAL at 00:36

## 2018-01-14 RX ADMIN — Medication 975 MILLIGRAM(S): at 06:00

## 2018-01-14 RX ADMIN — HEPARIN SODIUM 5000 UNIT(S): 5000 INJECTION INTRAVENOUS; SUBCUTANEOUS at 00:36

## 2018-01-14 NOTE — PROGRESS NOTE ADULT - PROBLEM SELECTOR PLAN 1
- CBC at 11 am  - Fe/Vitc/Colace for anemia.  - Continue regular diet.  - Increase ambulation.  - Continue motrin, tylenol, oxycodone PRN for pain control.   - Discontinue calvo catheter at 24 hours post-op     Yamilka Lizama PGY-1

## 2018-01-14 NOTE — PROVIDER CONTACT NOTE (OTHER) - SITUATION
c/s EBL 1500, f/u CBC from this AM @ 0400 6.6/20. c/s EBL 1500, f/u CBC from this AM @ 0400 6.6/20; currently 7.7/24.1 @ 1100.

## 2018-01-14 NOTE — PROVIDER CONTACT NOTE (OTHER) - ACTION/TREATMENT ORDERED:
no orders at this time. notify provider for any change in vitals or patient become symptomatic. no orders at this time. notify provider for any change in vitals or patient become symptomatic. no orders for repeat CBC.

## 2018-01-14 NOTE — PROGRESS NOTE ADULT - SUBJECTIVE AND OBJECTIVE BOX
Post Op C/S      Pt without complaints  vital signs stable      Abdomen soft  fundus firm  Incision clean dry and intact  extremities non tender    lochia wnl      Patient doing well  Routine post operative care

## 2018-01-14 NOTE — PROVIDER CONTACT NOTE (OTHER) - ASSESSMENT
stable vitals, patient denies lightheaded or dizziness. denies Chest pain, SOB or palpitations. light lochia. patient is asymptomatic of anemia at this time. stable vitals, patient denies lightheaded or dizziness. denies Chest pain, SOB or palpitations. light lochia. patient is asymptomatic of anemia at this time. ambulating to NICU to visit .

## 2018-01-14 NOTE — PROGRESS NOTE ADULT - SUBJECTIVE AND OBJECTIVE BOX
Patient s/p C/S POD#1 ebl 1500.  History of receiving iron infusions X7.  Called by RN due to H/H results.  Patient is asymptomatic.                          6.6    8.20  )-----------( 115      ( 14 Jan 2018 04:00 )             20.0                         7.4    8.96  )-----------( 98       ( 13 Jan 2018 18:30 )             23.5                         8.5    9.38  )-----------( 100      ( 13 Jan 2018 08:50 )             26.1                         9.5    7.40  )-----------( 134      ( 13 Jan 2018 01:40 )             28.5     d/w Dr. Knight, as per Dr. Stephenson, calvo may be removed and no repeat CBC needed at this time.

## 2018-01-14 NOTE — PROGRESS NOTE ADULT - ASSESSMENT
A/P: 41yo  POD#1 s/p pHTCS for marginal previa. EBL 1500, s/p Hemabate/lomotil. Hct: 28.5->26.1->23.5 ->20 with asymptomatic anemia. Patient is stable and doing well post-operatively.

## 2018-01-14 NOTE — PROGRESS NOTE ADULT - SUBJECTIVE AND OBJECTIVE BOX
OB Progress Note: Primary  Delivery, POD#1    S: 43yo  POD#1 s/p pHTCS for marginal previa. EBL 1500, s/p Hemabate/lomotil. Her pain is well controlled. She is tolerating a regular diet and passing flatus. Denies N/V. Denies CP/SOB/lightheadedness/dizziness. She is ambulating without difficulty. Indwelling catheter is in place.    O:   Vital Signs Last 24 Hrs  T(C): 36.4 (2018 05:48), Max: 36.7 (2018 02:03)  T(F): 97.5 (2018 05:48), Max: 98.1 (2018 02:03)  HR: 82 (2018 05:48) (64 - 86)  BP: 129/76 (2018 05:48) (101/62 - 129/76)  BP(mean): 80 (2018 16:00) (71 - 96)  RR: 17 (2018 05:48) (16 - 24)  SpO2: 99% (2018 05:48) (96% - 99%)    Labs:  Blood type: A Positive  Rubella IgG: Positive ( @ 02:15)  RPR: Negative                          6.6<LL>   8.20 >-----------< 115<L>    (  @ 04:00 )             20.0<LL>                        7.4<L>   8.96 >-----------< 98<L>    (  @ 18:30 )             23.5<L>                        8.5<L>   9.38 >-----------< 100<L>    (  @ 08:50 )             26.1<L>                        9.5<L>   7.40 >-----------< 134<L>    (  @ 01:40 )             28.5<L>    18 @ 01:40      139  |  104  |  6<L>  ----------------------------<  117<H>  4.1   |  21<L>  |  0.45<L>        Ca    8.9      2018 01:40    TPro  6.8  /  Alb  3.7  /  TBili  0.3  /  DBili  x   /  AST  16  /  ALT  13  /  AlkPhos  123<H>  18 @ 01:40          PE:  General: NAD  Abdomen: Mildly distended, appropriately tender, incision c/d/i.  Extremities: No erythema, no pitting edema

## 2018-01-14 NOTE — PROGRESS NOTE ADULT - SUBJECTIVE AND OBJECTIVE BOX
Acute OB Pain Service Note    POD#1 S/P  Section    Pt is doing well.    Pain Scale Score at rest:  8/10 (gas pain mostly)      with activity: 7-8/10 (a little better but still a lot of gas)    The patient received Duramorph 0.2 mg via spinal on 18.    Spinal site is: without erythema, discharge, edema, and is nontender.    The patient is afebrile, alert and oriented    Side Effects: No PONV, mild Pruritis, No Numbness, No Neuromuscular deficits noted.  Continue prn p.o. analgesics

## 2018-01-14 NOTE — PROGRESS NOTE ADULT - SUBJECTIVE AND OBJECTIVE BOX
Postop Day  1  s/p   C- Section    THERAPY:  [X] Spinal morphine   [  ] Epidural morphine   [  ] IV PCA Hydromorphone 1 mg/ml      Sedation Score:	  [x] Alert	    [  ] Drowsy        [  ] Arousable  	[  ] Asleep	[  ] Unresponsive    Side Effects:	  [X] None	     [  ] Nausea        [  ] Pruritus        [  ] Weakness   [  ] Numbness        ASSESSMENT/ PLAN   [   ] Discontinue         [X] Continue p.o. meds  [ x ]Documentation and Verification of current medications     Comments:

## 2018-01-14 NOTE — LACTATION INITIAL EVALUATION - INTERVENTION OUTCOME
Pt states she does not wish to breastfeed.  Pts decision supported and ways to prevent stimulation to breasts discussed.

## 2018-01-15 LAB
HCT VFR BLD CALC: 19.9 % — CRITICAL LOW (ref 34.5–45)
HCT VFR BLD CALC: 22.3 % — LOW (ref 34.5–45)
HGB BLD-MCNC: 6.4 G/DL — CRITICAL LOW (ref 11.5–15.5)
HGB BLD-MCNC: 7 G/DL — CRITICAL LOW (ref 11.5–15.5)
MCHC RBC-ENTMCNC: 31.4 % — LOW (ref 32–36)
MCHC RBC-ENTMCNC: 32.2 % — SIGNIFICANT CHANGE UP (ref 32–36)
MCHC RBC-ENTMCNC: 32.3 PG — SIGNIFICANT CHANGE UP (ref 27–34)
MCHC RBC-ENTMCNC: 33 PG — SIGNIFICANT CHANGE UP (ref 27–34)
MCV RBC AUTO: 102.6 FL — HIGH (ref 80–100)
MCV RBC AUTO: 102.8 FL — HIGH (ref 80–100)
NRBC # FLD: 0 — SIGNIFICANT CHANGE UP
NRBC # FLD: 0 — SIGNIFICANT CHANGE UP
PLATELET # BLD AUTO: 106 K/UL — LOW (ref 150–400)
PLATELET # BLD AUTO: 95 K/UL — LOW (ref 150–400)
PMV BLD: 10.1 FL — SIGNIFICANT CHANGE UP (ref 7–13)
PMV BLD: 9.5 FL — SIGNIFICANT CHANGE UP (ref 7–13)
RBC # BLD: 1.94 M/UL — LOW (ref 3.8–5.2)
RBC # BLD: 2.17 M/UL — LOW (ref 3.8–5.2)
RBC # FLD: 14.6 % — HIGH (ref 10.3–14.5)
RBC # FLD: 14.6 % — HIGH (ref 10.3–14.5)
WBC # BLD: 4.98 K/UL — SIGNIFICANT CHANGE UP (ref 3.8–10.5)
WBC # BLD: 5.63 K/UL — SIGNIFICANT CHANGE UP (ref 3.8–10.5)
WBC # FLD AUTO: 4.98 K/UL — SIGNIFICANT CHANGE UP (ref 3.8–10.5)
WBC # FLD AUTO: 5.63 K/UL — SIGNIFICANT CHANGE UP (ref 3.8–10.5)

## 2018-01-15 RX ORDER — FERROUS SULFATE 325(65) MG
325 TABLET ORAL
Qty: 0 | Refills: 0 | Status: DISCONTINUED | OUTPATIENT
Start: 2018-01-15 | End: 2018-01-16

## 2018-01-15 RX ORDER — ASCORBIC ACID 60 MG
500 TABLET,CHEWABLE ORAL DAILY
Qty: 0 | Refills: 0 | Status: DISCONTINUED | OUTPATIENT
Start: 2018-01-15 | End: 2018-01-16

## 2018-01-15 RX ORDER — DOCUSATE SODIUM 100 MG
100 CAPSULE ORAL
Qty: 0 | Refills: 0 | Status: DISCONTINUED | OUTPATIENT
Start: 2018-01-15 | End: 2018-01-16

## 2018-01-15 RX ORDER — TETANUS TOXOID, REDUCED DIPHTHERIA TOXOID AND ACELLULAR PERTUSSIS VACCINE, ADSORBED 5; 2.5; 8; 8; 2.5 [IU]/.5ML; [IU]/.5ML; UG/.5ML; UG/.5ML; UG/.5ML
0.5 SUSPENSION INTRAMUSCULAR ONCE
Qty: 0 | Refills: 0 | Status: COMPLETED | OUTPATIENT
Start: 2018-01-15 | End: 2018-01-15

## 2018-01-15 RX ADMIN — Medication 975 MILLIGRAM(S): at 07:00

## 2018-01-15 RX ADMIN — Medication 325 MILLIGRAM(S): at 19:04

## 2018-01-15 RX ADMIN — Medication 975 MILLIGRAM(S): at 01:45

## 2018-01-15 RX ADMIN — Medication 600 MILLIGRAM(S): at 01:45

## 2018-01-15 RX ADMIN — Medication 975 MILLIGRAM(S): at 19:04

## 2018-01-15 RX ADMIN — Medication 500 MILLIGRAM(S): at 12:38

## 2018-01-15 RX ADMIN — Medication 1 TABLET(S): at 19:04

## 2018-01-15 RX ADMIN — Medication 600 MILLIGRAM(S): at 12:38

## 2018-01-15 RX ADMIN — Medication 975 MILLIGRAM(S): at 19:31

## 2018-01-15 RX ADMIN — Medication 975 MILLIGRAM(S): at 12:38

## 2018-01-15 RX ADMIN — SIMETHICONE 80 MILLIGRAM(S): 80 TABLET, CHEWABLE ORAL at 12:46

## 2018-01-15 RX ADMIN — Medication 325 MILLIGRAM(S): at 12:38

## 2018-01-15 RX ADMIN — Medication 600 MILLIGRAM(S): at 07:00

## 2018-01-15 RX ADMIN — Medication 600 MILLIGRAM(S): at 13:30

## 2018-01-15 RX ADMIN — Medication 975 MILLIGRAM(S): at 06:32

## 2018-01-15 RX ADMIN — SIMETHICONE 80 MILLIGRAM(S): 80 TABLET, CHEWABLE ORAL at 05:34

## 2018-01-15 RX ADMIN — HEPARIN SODIUM 5000 UNIT(S): 5000 INJECTION INTRAVENOUS; SUBCUTANEOUS at 01:14

## 2018-01-15 RX ADMIN — HEPARIN SODIUM 5000 UNIT(S): 5000 INJECTION INTRAVENOUS; SUBCUTANEOUS at 12:38

## 2018-01-15 RX ADMIN — Medication 325 MILLIGRAM(S): at 08:20

## 2018-01-15 RX ADMIN — Medication 600 MILLIGRAM(S): at 19:05

## 2018-01-15 RX ADMIN — Medication 600 MILLIGRAM(S): at 06:32

## 2018-01-15 RX ADMIN — Medication 100 MILLIGRAM(S): at 01:14

## 2018-01-15 RX ADMIN — Medication 975 MILLIGRAM(S): at 01:14

## 2018-01-15 RX ADMIN — Medication 100 MILLIGRAM(S): at 05:34

## 2018-01-15 RX ADMIN — SIMETHICONE 80 MILLIGRAM(S): 80 TABLET, CHEWABLE ORAL at 01:14

## 2018-01-15 RX ADMIN — Medication 600 MILLIGRAM(S): at 19:31

## 2018-01-15 RX ADMIN — Medication 600 MILLIGRAM(S): at 01:14

## 2018-01-15 RX ADMIN — Medication 975 MILLIGRAM(S): at 13:30

## 2018-01-15 RX ADMIN — Medication 100 MILLIGRAM(S): at 19:07

## 2018-01-15 NOTE — PROVIDER CONTACT NOTE (OTHER) - SITUATION
Received critical value results from Kristi Ndiaye in lab at 0724 Hb - 6.4 and HTC 19.9. Dr Lizama notified and aware at 07:32.

## 2018-01-15 NOTE — PROGRESS NOTE ADULT - SUBJECTIVE AND OBJECTIVE BOX
Postpartum Note,  Section  She is a  42y woman who is now post-operative day:     Subjective:  The patient feels well.  She is ambulating.   She is tolerating regular diet.  She denies nausea and vomiting.  She is voiding.  Her pain is controlled.  She reports normal postpartum bleeding.        Physical exam:    Vital Signs Last 24 Hrs  T(C): 37 (15 Preston 2018 05:27), Max: 37.1 (2018 22:15)  T(F): 98.6 (15 Preston 2018 05:27), Max: 98.8 (2018 22:15)  HR: 83 (15 Preston 2018 05:27) (80 - 96)  BP: 114/68 (15 Preston 2018 05:27) (114/68 - 125/79)  BP(mean): --  RR: 18 (15 Preston 2018 05:27) (18 - 18)  SpO2: 99% (15 Preston 2018 05:27) (99% - 100%)    Gen: NAD  Breast: Soft, nontender, not engorged.  Abdomen: Soft, nontender, no distension , firm uterine fundus at umbilicus.  Incision: Clean, dry, and intact with steri strips  Pelvic: Normal lochia noted  Ext: No calf tenderness    LABS:                        6.4    4.98  )-----------( 95       ( 15 Preston 2018 05:44 )             19.9                         7.7    8.07  )-----------( 112      ( 2018 11:05 )             24.1                         6.6    8.20  )-----------( 115      ( 2018 04:00 )             20.0                         7.4    8.96  )-----------( 98       ( 2018 18:30 )             23.5                         8.5    9.38  )-----------( 100      ( 2018 08:50 )             26.1       18 @ 01:40      139  |  104  |  6<L>  ----------------------------<  117<H>  4.1   |  21<L>  |  0.45<L>        Ca    8.9      2018 01:40    TPro  6.8  /  Alb  3.7  /  TBili  0.3  /  DBili  x   /  AST  16  /  ALT  13  /  AlkPhos  123<H>  18 @ 01:40        Allergies    codeine (Anaphylaxis; Swelling)  iodine (Anaphylaxis; Swelling)    Intolerances      MEDICATIONS  (STANDING):  acetaminophen   Tablet. 975 milliGRAM(s) Oral every 6 hours  ascorbic acid 500 milliGRAM(s) Oral daily  diphtheria/tetanus/pertussis (acellular) Vaccine (ADAcel) 0.5 milliLiter(s) IntraMuscular once  docusate sodium 100 milliGRAM(s) Oral two times a day  ferrous    sulfate 325 milliGRAM(s) Oral three times a day with meals  heparin  Injectable 5000 Unit(s) SubCutaneous every 12 hours  ibuprofen  Tablet 600 milliGRAM(s) Oral every 6 hours  influenza   Vaccine 0.5 milliLiter(s) IntraMuscular once  prenatal multivitamin 1 Tablet(s) Oral daily    MEDICATIONS  (PRN):  diphenhydrAMINE   Capsule 25 milliGRAM(s) Oral every 6 hours PRN Itching  glycerin Suppository - Adult 1 Suppository(s) Rectal at bedtime PRN Constipation  lanolin Ointment 1 Application(s) Topical every 3 hours PRN Sore Nipples  simethicone 80 milliGRAM(s) Chew every 4 hours PRN Gas        Assessment and Plan  POD #2 s/p  section previa chronic anemia    pt states only one episode of lightheadedness and dizziness   declining blood transfusion at this time  Encourage ambulation.  CBC at 10a

## 2018-01-15 NOTE — PROVIDER CONTACT NOTE (CRITICAL VALUE NOTIFICATION) - ASSESSMENT
Pt asymptomatic no c/o dizziness, lightheadedness, CP or SOB. Pt ambulating in [place] and encouraged to let us know if she has any sympyoms. Pt verbalized understanding.

## 2018-01-15 NOTE — PROGRESS NOTE ADULT - ASSESSMENT
42y year old woman post-operative day 2 sp pHTCS for marginal previa with EBL of 1500 now with symptoms of anemia.  Patient remains hemodynamically stable and meeting all postpartum milestones.

## 2018-01-15 NOTE — PROGRESS NOTE ADULT - SUBJECTIVE AND OBJECTIVE BOX
Postpartum Note,  Section   42y year old woman post-operative day 2 sp pHTCS for marginal previa with EBL of 1500.  No acute events overnight.  Patient complains of lightheadedness on ambulation and palpitations.  Pain is well-controlled.  Patient is tolerating regular diet, passing flatus, ambulating, and is voiding spontaneously.  Postpartum bleeding is well controlled.  She is bottlefeeding her baby.    Physical exam:    Vital Signs Last 24 Hrs  T(C): 36.4 (15 Preston 2018 01:23), Max: 37.1 (2018 22:15)  T(F): 97.5 (15 Preston 2018 01:23), Max: 98.8 (2018 22:15)  HR: 80 (15 Preston 2018 01:) (80 - 96)  BP: 116/79 (15 Preston 2018 01:) (116/79 - 129/76)  BP(mean): --  RR: 18 (15 Preston 2018 01:23) (17 - 18)  SpO2: 99% (15 Preston 2018 01:) (99% - 100%)    Gen: NAD  Abdomen: Soft, nontender, no distension , firm uterine fundus at umbilicus.  Incision: Clean, dry, and intact with steri strips  Pelvic: Normal lochia noted  Ext: No calf tenderness    LABS:                        7.7    8.07  )-----------( 112      ( 2018 11:05 )             24.1                         6.6    8.20  )-----------( 115      ( 2018 04:00 )             20.0                         7.4    8.96  )-----------( 98       ( 2018 18:30 )             23.5                         8.5    9.38  )-----------( 100      ( 2018 08:50 )             26.1       18 @ 01:40      139  |  104  |  6<L>  ----------------------------<  117<H>  4.1   |  21<L>  |  0.45<L>        Ca    8.9      2018 01:40    TPro  6.8  /  Alb  3.7  /  TBili  0.3  /  DBili  x   /  AST  16  /  ALT  13  /  AlkPhos  123<H>  18 @ 01:40

## 2018-01-15 NOTE — PROGRESS NOTE ADULT - PROBLEM SELECTOR PLAN 1
-Encourage Ambulation  -Continue with regular diet  -Heparin, SCDs, and ambulation for DVT ppx  -Analgesia as needed  -Repeat CBC for symptoms of anemia    LINDA Rivas PGY2

## 2018-01-16 VITALS
SYSTOLIC BLOOD PRESSURE: 118 MMHG | TEMPERATURE: 98 F | HEART RATE: 80 BPM | RESPIRATION RATE: 18 BRPM | DIASTOLIC BLOOD PRESSURE: 74 MMHG | OXYGEN SATURATION: 98 %

## 2018-01-16 RX ADMIN — HEPARIN SODIUM 5000 UNIT(S): 5000 INJECTION INTRAVENOUS; SUBCUTANEOUS at 00:30

## 2018-01-16 RX ADMIN — SIMETHICONE 80 MILLIGRAM(S): 80 TABLET, CHEWABLE ORAL at 01:30

## 2018-01-16 RX ADMIN — Medication 975 MILLIGRAM(S): at 07:25

## 2018-01-16 RX ADMIN — Medication 975 MILLIGRAM(S): at 00:30

## 2018-01-16 RX ADMIN — Medication 600 MILLIGRAM(S): at 14:01

## 2018-01-16 RX ADMIN — Medication 600 MILLIGRAM(S): at 00:30

## 2018-01-16 RX ADMIN — Medication 975 MILLIGRAM(S): at 07:01

## 2018-01-16 RX ADMIN — Medication 975 MILLIGRAM(S): at 13:30

## 2018-01-16 RX ADMIN — Medication 600 MILLIGRAM(S): at 14:00

## 2018-01-16 RX ADMIN — Medication 500 MILLIGRAM(S): at 14:01

## 2018-01-16 RX ADMIN — Medication 325 MILLIGRAM(S): at 14:01

## 2018-01-16 RX ADMIN — Medication 600 MILLIGRAM(S): at 07:01

## 2018-01-16 RX ADMIN — HEPARIN SODIUM 5000 UNIT(S): 5000 INJECTION INTRAVENOUS; SUBCUTANEOUS at 14:01

## 2018-01-16 RX ADMIN — Medication 600 MILLIGRAM(S): at 07:25

## 2018-01-16 RX ADMIN — Medication 325 MILLIGRAM(S): at 06:41

## 2018-01-16 RX ADMIN — Medication 975 MILLIGRAM(S): at 01:10

## 2018-01-16 RX ADMIN — Medication 975 MILLIGRAM(S): at 14:00

## 2018-01-16 RX ADMIN — Medication 600 MILLIGRAM(S): at 01:10

## 2018-01-16 RX ADMIN — Medication 100 MILLIGRAM(S): at 06:41

## 2018-01-16 RX ADMIN — TETANUS TOXOID, REDUCED DIPHTHERIA TOXOID AND ACELLULAR PERTUSSIS VACCINE, ADSORBED 0.5 MILLILITER(S): 5; 2.5; 8; 8; 2.5 SUSPENSION INTRAMUSCULAR at 00:30

## 2018-01-16 NOTE — PROGRESS NOTE ADULT - PROBLEM SELECTOR PLAN 1
- Pt will f/u with hematology outpatient regarding Fe transfusions for anemia.  - Continue motrin, tylenol, oxycodone PRN for pain control.  - Increase ambulation  - Continue regular diet  - Discharge planning    Yamilka Lizama PGY-1

## 2018-01-16 NOTE — PROGRESS NOTE ADULT - ASSESSMENT
A/P: 43yo  POD#3 s/p pHTCS for marginal previa. Pt declined blood transfusion yesterday. Symptoms of anemia resolved.  Patient is stable and is doing well post-operatively.

## 2018-01-20 LAB — SURGICAL PATHOLOGY STUDY: SIGNIFICANT CHANGE UP

## 2018-06-13 ENCOUNTER — RESULT REVIEW (OUTPATIENT)
Age: 43
End: 2018-06-13

## 2020-01-21 ENCOUNTER — RESULT REVIEW (OUTPATIENT)
Age: 45
End: 2020-01-21

## 2020-12-17 ENCOUNTER — TRANSCRIPTION ENCOUNTER (OUTPATIENT)
Age: 45
End: 2020-12-17

## 2021-08-27 ENCOUNTER — TRANSCRIPTION ENCOUNTER (OUTPATIENT)
Age: 46
End: 2021-08-27

## 2022-02-10 ENCOUNTER — RESULT REVIEW (OUTPATIENT)
Age: 47
End: 2022-02-10

## 2022-02-17 ENCOUNTER — EMERGENCY (EMERGENCY)
Facility: HOSPITAL | Age: 47
LOS: 1 days | Discharge: HOME | End: 2022-02-17
Attending: EMERGENCY MEDICINE | Admitting: EMERGENCY MEDICINE
Payer: COMMERCIAL

## 2022-02-17 VITALS
WEIGHT: 169.98 LBS | HEIGHT: 65 IN | HEART RATE: 76 BPM | SYSTOLIC BLOOD PRESSURE: 133 MMHG | RESPIRATION RATE: 18 BRPM | DIASTOLIC BLOOD PRESSURE: 83 MMHG | TEMPERATURE: 98 F | OXYGEN SATURATION: 98 %

## 2022-02-17 VITALS
RESPIRATION RATE: 17 BRPM | OXYGEN SATURATION: 98 % | DIASTOLIC BLOOD PRESSURE: 79 MMHG | HEART RATE: 76 BPM | SYSTOLIC BLOOD PRESSURE: 118 MMHG | TEMPERATURE: 98 F

## 2022-02-17 DIAGNOSIS — Z98.89 OTHER SPECIFIED POSTPROCEDURAL STATES: Chronic | ICD-10-CM

## 2022-02-17 DIAGNOSIS — Z90.49 ACQUIRED ABSENCE OF OTHER SPECIFIED PARTS OF DIGESTIVE TRACT: Chronic | ICD-10-CM

## 2022-02-17 PROCEDURE — 76705 ECHO EXAM OF ABDOMEN: CPT | Mod: 26

## 2022-02-17 PROCEDURE — 76705 ECHO EXAM OF ABDOMEN: CPT

## 2022-02-17 PROCEDURE — 10061 I&D ABSCESS COMP/MULTIPLE: CPT

## 2022-02-17 PROCEDURE — 99284 EMERGENCY DEPT VISIT MOD MDM: CPT | Mod: 25

## 2022-02-17 PROCEDURE — 10060 I&D ABSCESS SIMPLE/SINGLE: CPT

## 2022-02-17 RX ORDER — AZTREONAM 2 G
1 VIAL (EA) INJECTION
Qty: 20 | Refills: 0
Start: 2022-02-17 | End: 2022-02-26

## 2022-02-17 RX ORDER — IBUPROFEN 200 MG
600 TABLET ORAL ONCE
Refills: 0 | Status: COMPLETED | OUTPATIENT
Start: 2022-02-17 | End: 2022-02-17

## 2022-02-17 RX ORDER — ACETAMINOPHEN 500 MG
975 TABLET ORAL ONCE
Refills: 0 | Status: COMPLETED | OUTPATIENT
Start: 2022-02-17 | End: 2022-02-17

## 2022-02-17 RX ADMIN — Medication 975 MILLIGRAM(S): at 10:53

## 2022-02-17 RX ADMIN — Medication 600 MILLIGRAM(S): at 10:53

## 2022-02-17 RX ADMIN — Medication 1 TABLET(S): at 10:53

## 2022-02-17 NOTE — ED PROVIDER NOTE - PATIENT PORTAL LINK FT
You can access the FollowMyHealth Patient Portal offered by [facility] by registering at the following website: [email or web address]. By joining [employer]’s FollowMyHealth portal, you will also be able to view your health information using other applications (apps) compatible with our system.

## 2022-02-17 NOTE — ED ADULT NURSE NOTE - NSIMPLEMENTINTERV_GEN_ALL_ED
Implemented All Universal Safety Interventions:  [place] to call system. Call bell, personal items and telephone within reach. Instruct patient to call for assistance. Room bathroom lighting operational. Non-slip footwear when patient is off stretcher. Physically safe environment: no spills, clutter or unnecessary equipment. Stretcher in lowest position, wheels locked, appropriate side rails in place.

## 2022-02-17 NOTE — ED PROVIDER NOTE - CARE PROVIDER_API CALL
[facility]  Internal Medicine  [address], [address] [place] [address]  Phone: [phone]  Fax: ([phone]  Follow Up Time:

## 2022-02-17 NOTE — ED PROVIDER NOTE - OBJECTIVE STATEMENT
46 female presents to ER c/o pain to left upper abdomen over skin area for 3 days, getting worse today, denies fever, no vomiting.

## 2022-02-17 NOTE — ED PROVIDER NOTE - CLINICAL SUMMARY MEDICAL DECISION MAKING FREE TEXT BOX
arpit left upper quadrant skin abscess, f/u US, start antibiotics, pain control arpit left upper quadrant skin abscess, f/u US, start antibiotics, pain control, incision and drainage

## 2022-02-17 NOTE — ED ADULT NURSE NOTE - TEMPLATE
Your stool results are normal.  Please continue your current medications and doses.  Please feel free to contact me with any questions or concerns.    Sincerely,  Darren Fletcher  [email or web address]   Wounds

## 2022-02-17 NOTE — ED PROVIDER NOTE - NSFOLLOWUPINSTRUCTIONS_ED_ALL_ED_FT
Follow up with your primary care doctor for wound check and packing removal in the next 1-2 days, if unable to see your doctor then return to ER. Take bactrim as directed.        WHAT YOU NEED TO KNOW:    A warm compress may help your abscess drain. Your healthcare provider may make a cut in the abscess so it can drain. You may need surgery to remove an abscess that is on your hands or buttocks.    Skin Abscess         DISCHARGE INSTRUCTIONS:    Return to the emergency department if:   •The area around your abscess becomes very painful, warm, or has red streaks.      •You have a fever and chills.      •Your heart is beating faster than usual.      •You feel faint or confused.      Call your doctor if:   •Your abscess gets bigger or does not get better.      •Your abscess returns.      •You have questions or concerns about your condition or care.      Medicines: You may need any of the following:  •Antibiotics help treat a bacterial infection.      •Acetaminophen decreases pain and fever. It is available without a doctor's order. Ask how much to take and how often to take it. Follow directions. Read the labels of all other medicines you are using to see if they also contain acetaminophen, or ask your doctor or pharmacist. Acetaminophen can cause liver damage if not taken correctly. Do not use more than 4 grams (4,000 milligrams) total of acetaminophen in one day.       •NSAIDs, such as ibuprofen, help decrease swelling, pain, and fever. This medicine is available with or without a doctor's order. NSAIDs can cause stomach bleeding or kidney problems in certain people. If you take blood thinner medicine, always ask your healthcare provider if NSAIDs are safe for you. Always read the medicine label and follow directions.      •Take your medicine as directed. Contact your healthcare provider if you think your medicine is not helping or if you have side effects. Tell him or her if you are allergic to any medicine. Keep a list of the medicines, vitamins, and herbs you take. Include the amounts, and when and why you take them. Bring the list or the pill bottles to follow-up visits. Carry your medicine list with you in case of an emergency.      Self-care:   •Apply a warm compress to your abscess. This will help it open and drain. Wet a washcloth in warm, but not hot, water. Apply the compress for 10 minutes. Repeat this 4 times each day. Do not press on an abscess or try to open it with a needle. You may push the bacteria deeper or into your blood.      •Do not share your clothes, towels, or sheets with anyone. This can spread the infection to others.      •Wash your hands often. This can help prevent the spread of germs. Use soap and water or an alcohol-based hand rub.  Handwashing           Care for your wound after it is drained:   •Care for your wound as directed. If your healthcare provider says it is okay, carefully remove the bandage and gauze packing. You may need to soak the gauze to get it out of your wound. Clean your wound and the area around it as directed. Dry the area and put on new, clean bandages. Change your bandages when they get wet or dirty.      •Ask your healthcare provider how to change the gauze in your wound. Keep track of how many pieces of gauze are placed inside the wound. Do not put too much packing in the wound. Do not pack the gauze too tightly in your wound.      Follow up with your healthcare provider in 1 to 3 days: You may need to have your packing removed or your bandage changed. Write down your questions so you remember to ask them during your visits.       © Copyright [employer] 2022           back to top                          © Copyright [employer] 2022

## 2022-09-18 ENCOUNTER — EMERGENCY (EMERGENCY)
Facility: HOSPITAL | Age: 47
LOS: 1 days | Discharge: HOME | End: 2022-09-18
Attending: EMERGENCY MEDICINE | Admitting: EMERGENCY MEDICINE
Payer: COMMERCIAL

## 2022-09-18 ENCOUNTER — TRANSCRIPTION ENCOUNTER (OUTPATIENT)
Age: 47
End: 2022-09-18

## 2022-09-18 VITALS
SYSTOLIC BLOOD PRESSURE: 129 MMHG | OXYGEN SATURATION: 97 % | HEIGHT: 65 IN | WEIGHT: 164.91 LBS | DIASTOLIC BLOOD PRESSURE: 85 MMHG | TEMPERATURE: 98 F | RESPIRATION RATE: 20 BRPM | HEART RATE: 96 BPM

## 2022-09-18 DIAGNOSIS — Z98.89 OTHER SPECIFIED POSTPROCEDURAL STATES: Chronic | ICD-10-CM

## 2022-09-18 DIAGNOSIS — Z90.49 ACQUIRED ABSENCE OF OTHER SPECIFIED PARTS OF DIGESTIVE TRACT: Chronic | ICD-10-CM

## 2022-09-18 LAB
ALBUMIN SERPL ELPH-MCNC: 3.6 G/DL — SIGNIFICANT CHANGE UP (ref 3.3–5)
ALP SERPL-CCNC: 116 U/L — SIGNIFICANT CHANGE UP (ref 40–120)
ALT FLD-CCNC: 29 U/L — SIGNIFICANT CHANGE UP (ref 12–78)
AMYLASE P1 CFR SERPL: 35 U/L — SIGNIFICANT CHANGE UP (ref 25–125)
ANION GAP SERPL CALC-SCNC: 5 MMOL/L — SIGNIFICANT CHANGE UP (ref 5–17)
AST SERPL-CCNC: 15 U/L — SIGNIFICANT CHANGE UP (ref 15–37)
BASOPHILS # BLD AUTO: 0.03 K/UL — SIGNIFICANT CHANGE UP (ref 0–0.2)
BASOPHILS NFR BLD AUTO: 0.2 % — SIGNIFICANT CHANGE UP (ref 0–2)
BILIRUB SERPL-MCNC: 1.5 MG/DL — HIGH (ref 0.2–1.2)
BUN SERPL-MCNC: 10 MG/DL — SIGNIFICANT CHANGE UP (ref 7–23)
CALCIUM SERPL-MCNC: 9 MG/DL — SIGNIFICANT CHANGE UP (ref 8.5–10.1)
CHLORIDE SERPL-SCNC: 104 MMOL/L — SIGNIFICANT CHANGE UP (ref 96–108)
CO2 SERPL-SCNC: 27 MMOL/L — SIGNIFICANT CHANGE UP (ref 22–31)
CREAT SERPL-MCNC: 0.79 MG/DL — SIGNIFICANT CHANGE UP (ref 0.5–1.3)
EGFR: 94 ML/MIN/1.73M2 — SIGNIFICANT CHANGE UP
EOSINOPHIL # BLD AUTO: 0.01 K/UL — SIGNIFICANT CHANGE UP (ref 0–0.5)
EOSINOPHIL NFR BLD AUTO: 0.1 % — SIGNIFICANT CHANGE UP (ref 0–6)
GLUCOSE SERPL-MCNC: 122 MG/DL — HIGH (ref 70–99)
HCG SERPL-ACNC: <1 MIU/ML — SIGNIFICANT CHANGE UP
HCT VFR BLD CALC: 34.3 % — LOW (ref 34.5–45)
HGB BLD-MCNC: 11.6 G/DL — SIGNIFICANT CHANGE UP (ref 11.5–15.5)
IMM GRANULOCYTES NFR BLD AUTO: 0.3 % — SIGNIFICANT CHANGE UP (ref 0–0.9)
LACTATE SERPL-SCNC: 1.1 MMOL/L — SIGNIFICANT CHANGE UP (ref 0.7–2)
LIDOCAIN IGE QN: 115 U/L — SIGNIFICANT CHANGE UP (ref 73–393)
LYMPHOCYTES # BLD AUTO: 0.53 K/UL — LOW (ref 1–3.3)
LYMPHOCYTES # BLD AUTO: 3.5 % — LOW (ref 13–44)
MCHC RBC-ENTMCNC: 30.9 PG — SIGNIFICANT CHANGE UP (ref 27–34)
MCHC RBC-ENTMCNC: 33.8 GM/DL — SIGNIFICANT CHANGE UP (ref 32–36)
MCV RBC AUTO: 91.5 FL — SIGNIFICANT CHANGE UP (ref 80–100)
MONOCYTES # BLD AUTO: 1.2 K/UL — HIGH (ref 0–0.9)
MONOCYTES NFR BLD AUTO: 8 % — SIGNIFICANT CHANGE UP (ref 2–14)
NEUTROPHILS # BLD AUTO: 13.21 K/UL — HIGH (ref 1.8–7.4)
NEUTROPHILS NFR BLD AUTO: 87.9 % — HIGH (ref 43–77)
NRBC # BLD: 0 /100 WBCS — SIGNIFICANT CHANGE UP (ref 0–0)
PLATELET # BLD AUTO: 162 K/UL — SIGNIFICANT CHANGE UP (ref 150–400)
POTASSIUM SERPL-MCNC: 3.5 MMOL/L — SIGNIFICANT CHANGE UP (ref 3.5–5.3)
POTASSIUM SERPL-SCNC: 3.5 MMOL/L — SIGNIFICANT CHANGE UP (ref 3.5–5.3)
PROT SERPL-MCNC: 8.1 G/DL — SIGNIFICANT CHANGE UP (ref 6–8.3)
RBC # BLD: 3.75 M/UL — LOW (ref 3.8–5.2)
RBC # FLD: 12.9 % — SIGNIFICANT CHANGE UP (ref 10.3–14.5)
SODIUM SERPL-SCNC: 136 MMOL/L — SIGNIFICANT CHANGE UP (ref 135–145)
WBC # BLD: 15.03 K/UL — HIGH (ref 3.8–10.5)
WBC # FLD AUTO: 15.03 K/UL — HIGH (ref 3.8–10.5)

## 2022-09-18 PROCEDURE — 99284 EMERGENCY DEPT VISIT MOD MDM: CPT | Mod: 25

## 2022-09-18 PROCEDURE — 96374 THER/PROPH/DIAG INJ IV PUSH: CPT

## 2022-09-18 PROCEDURE — 36415 COLL VENOUS BLD VENIPUNCTURE: CPT

## 2022-09-18 PROCEDURE — 99285 EMERGENCY DEPT VISIT HI MDM: CPT

## 2022-09-18 PROCEDURE — G1004: CPT

## 2022-09-18 PROCEDURE — 83690 ASSAY OF LIPASE: CPT

## 2022-09-18 PROCEDURE — 84702 CHORIONIC GONADOTROPIN TEST: CPT

## 2022-09-18 PROCEDURE — 76830 TRANSVAGINAL US NON-OB: CPT | Mod: 26

## 2022-09-18 PROCEDURE — 74176 CT ABD & PELVIS W/O CONTRAST: CPT | Mod: ME

## 2022-09-18 PROCEDURE — 80053 COMPREHEN METABOLIC PANEL: CPT

## 2022-09-18 PROCEDURE — 82150 ASSAY OF AMYLASE: CPT

## 2022-09-18 PROCEDURE — 74176 CT ABD & PELVIS W/O CONTRAST: CPT | Mod: 26,ME

## 2022-09-18 PROCEDURE — 83605 ASSAY OF LACTIC ACID: CPT

## 2022-09-18 PROCEDURE — 85025 COMPLETE CBC W/AUTO DIFF WBC: CPT

## 2022-09-18 PROCEDURE — 76830 TRANSVAGINAL US NON-OB: CPT

## 2022-09-18 PROCEDURE — 96375 TX/PRO/DX INJ NEW DRUG ADDON: CPT

## 2022-09-18 RX ORDER — ACETAMINOPHEN 500 MG
1000 TABLET ORAL ONCE
Refills: 0 | Status: COMPLETED | OUTPATIENT
Start: 2022-09-18 | End: 2022-09-18

## 2022-09-18 RX ORDER — FAMOTIDINE 10 MG/ML
20 INJECTION INTRAVENOUS ONCE
Refills: 0 | Status: COMPLETED | OUTPATIENT
Start: 2022-09-18 | End: 2022-09-18

## 2022-09-18 RX ORDER — ONDANSETRON 8 MG/1
4 TABLET, FILM COATED ORAL ONCE
Refills: 0 | Status: COMPLETED | OUTPATIENT
Start: 2022-09-18 | End: 2022-09-18

## 2022-09-18 RX ORDER — IBUPROFEN 200 MG
1 TABLET ORAL
Qty: 15 | Refills: 0
Start: 2022-09-18 | End: 2022-09-22

## 2022-09-18 RX ORDER — SODIUM CHLORIDE 9 MG/ML
1000 INJECTION INTRAMUSCULAR; INTRAVENOUS; SUBCUTANEOUS ONCE
Refills: 0 | Status: COMPLETED | OUTPATIENT
Start: 2022-09-18 | End: 2022-09-18

## 2022-09-18 RX ADMIN — FAMOTIDINE 20 MILLIGRAM(S): 10 INJECTION INTRAVENOUS at 13:20

## 2022-09-18 RX ADMIN — SODIUM CHLORIDE 1000 MILLILITER(S): 9 INJECTION INTRAMUSCULAR; INTRAVENOUS; SUBCUTANEOUS at 13:19

## 2022-09-18 RX ADMIN — Medication 400 MILLIGRAM(S): at 14:42

## 2022-09-18 RX ADMIN — ONDANSETRON 4 MILLIGRAM(S): 8 TABLET, FILM COATED ORAL at 13:19

## 2022-09-18 NOTE — ED PROVIDER NOTE - NS ED ATTENDING STATEMENT MOD
This was a shared visit with the ANTON. I reviewed and verified the documentation and independently performed the documented:

## 2022-09-18 NOTE — ED ADULT NURSE NOTE - OBJECTIVE STATEMENT
Patient arrives alert and oriented c/o lower abdominal pain. Patient states that she went to [employer] on Friday, that night began experiencing abdominal pain with nausea vomiting diarrhea. Patient reports symptoms have been persistent sicne then. Patient took imodium today. Patient breathing with ease, reports tenderness to abdominal region, 9/10 pain. Quentin placed 20G left AC. Labs collected pending results.

## 2022-09-18 NOTE — ED ADULT NURSE NOTE - SUICIDE SCREENING DEPRESSION
Patient: Amanda Menon Date of Service: 2017     : 1956 Attending: Lea Bower MD   61 year old female      HYPERBARIC OXYGEN THERAPY PROCEDURE NOTE    Hyperbaric Treatment Number: 13 - Hyperbaric treatment scheduled once a day.   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 Boubacar Garcia   Consult/Update Date: 2017        Supervising Hyperbaric Physician 17: Dr. Laura Harris    PROCEDURE:  Pre-Hyperbaric Oxygen Therapy Treatment timeout was completed.    Treatment Profile: 45 fsw X 90 min  Treatment Start Time: 1347  Treatment End Time: 1547   Descent Time (min): 10 min  Air break total time: 10 min  Ascent time (min): 10    Hyperbaric oxygen therapy was monitored during entire course of treatment by the supervising physician.    HISTORY:  History of Present/ Illness:  This is a 61 year old female with past medical Hx positive for Type 2 DM, CKD stage III, HTN, hyperlipidemia, CAD s/p stent placement and chronic pain who was admitted with right foot pain, drainage and odor. Patient stepped on the piece of glass  in 2017 just over one month ago & developed cellulitis. This was subsequently treated with antibiotics. The wound failed to heal. The patient developed foul odor and was readmitted to the hospital on 2017.      CT of the right foot on 2017 showed:  Findings consistent with osteomyelitis involving the distal fifth metatarsal head and the base of the proximal phalanx. This is concordant with the recent MRI findings.  2.  Large ventral foot ulcer with subcutaneous gas extending into thefourth fifth intermetatarsal/interphalangeal space.   3.  Probable associated cellulitis.     Patient s/p debridement of right foot on 2017 with  Dr. Carlisle. Per surgical findings, the 5th toe appeared as if it will require amputation but since this was not consented for and no family present - decision to leave as soft tissue debridement was made.  Patient is s/p ray amputation of 5th right with Dr. Carlisle on 7/21/2017.      HBO initiated on 7/23/2017. Patient stated that she developed RLE numbness 7/23/2017 in the evening. CT head negative for acute changes.      Interval history: the patient presented to the ED 8/6/17 for chest pain that started the evening before, and she was admitted.  She also noted pain and swelling of her right foot surgical site.      She had undergone three hyperbaric treatments ending 7/25/17.  Right leg angiogram 8/4/17 showed occluded PT and Peroneal arteries.  Focal stenoses were treated with angioplasty.  Wound care was consulted for evaluation and treatment of the right foot surgical site.  Previous to this hospital visit the patient did not wish to receive hyperbaric treatments and was placed on the hold list.      Risk Assessment at Consult:    Risk assessment was performed at time of consult/comprehensive evaluation on 7/21/2017. Specifically noted risks include:    History of congestive heart failure or pulmonary edema  Diabetic on glucose lowering medications     Interval History:     The patient reports no complaints.     Pertinent Reviewed:    Allergies, Medication, Surgical History and Medical History, photos, progress notes  Comparison: Chest x-ray 6/15/2017. Findings: The cardiomediastinal silhouette is unremarkable. There are no pleural effusions, focal consolidations, or pneumothoraces. The pulmonary vascularity is normal.    PHYSICAL EXAM:  Daily Risk Assessment: Finger Stick Glucose:         Recent Labs  Lab 08/23/17  1251   GLUCOSE BEDSIDE 184*        Vital signs:    Temp: 98.4 °F (36.9 °C)  Temp src: Temporal Artery  Pulse: 79  Heart Rate Source: Monitor  BP: 114/58  MAP (mmHg): 96  Resp: 16    General  appearance:  alert, cooperative  Head:   normocephalic without obvious abnormality  Eye:  conjunctivae/sclerae normal. No erythema, edema or exudate.  Ears:   left ear TEED  scale 0, right ear TEED  scale 0 and able to autoinflate ears without difficulty.  Mouth:   mucous membranes moist  Lungs:  clear to auscultation bilaterally  Heart:  regular rate and rhythm and no murmur    Exam 8/22/17:  Necrosis/nonviable tissue throughout wound bed.  Right D5 amputation site with mixed fibrin and slough and small amount of grandular tissue as well. There was no odor, purulence or fluctuance.   Rachelle wound with rubor discoloration.         POST TREATMENT:    Patient denies complaints    Patient tolerated the hyperbaric treatment without problems or side effects     Treatment extras: None    Post-treatment Exam:  No change in appearance from pre-treatment    MEDICAL DECISION MAKING:  On July 21 the patient had amputation of her right fifth toe. The operative field reflected sizable full thickness surgical wound of the lateral aspect of the right foot with extensive soft tissue devitalization and bone necrosis of the right fifth toe. The proximal amputation margin went back to the proximal third of the fifth metatarsal.  Based on recent clinical evaluation, the patient is stabilizing.    CT ANGIO 8/14/2017 IMPRESSION:  1. Mild to moderate aortoiliac atherosclerotic disease without aortic  aneurysmal dilatation or significant aorto iliac stenosis.  2. Multilevel left lower extremity focal superficial femoral and popliteal  arterial stenoses with focal high-grade 70-90% stenosis at the left  abductor canal and scattered 50-60% stenoses.  3. Multilevel right lower extremity focal superficial femoral and popliteal  arterial stenoses with up to 50% narrowing.  4. Patent right anterior tibial artery; moderate to severe right posterior  tibial and peroneal arterial disease.    VASCULAR OPINION:  Reviewed CTA performed 8/14/17 which  showed patent aorto iliac inflow and patent recent Rt anterior tibial artery angioplasty site with no additional lesions amenable to endovascular therapy or surgical bypass. We discussed results with the patient.  Further management will likely require amputation, but we will defer to surgery for opinion. - Zhao Ayala MD    OPERATIVE PROCEDURE 8/17/2017:  Third and fourth transmetatarsal amputation and wound debridement and partial wound closure.    Indications of this are:  Not applicable.    Therefore, we will continue treatment as the patient will likely benefit from further hyperbaric oxygen therapy.      ASSESSMENT:  61 year old This is a 61 year old female with past medical Hx positive for Type 2 DM, CKD stage III, HTN, hyperlipidemia, CAD s/p stent placement and chronic pain with a nonhealing surgical wound and a diabetic foot ulcer.      PLAN:  This is hyperbaric Treatment Number: 13 of anticipated 30 treatments for a Richardson 3 diabetic foot ulcer with associated osteomyelitis and abscess which has now been transformed to a transmetatarsal amputation with compromised flap based on the angiographic findings above coupled with the physiologic failure to heal.    Periodic radha studies will provide additional clinical guidance for HBOT.     Wound Vac placed 8/22/17.    Plan discussed.  Patient stable at time of discharge.  All questions were answered prior to discharge.    KESHAWN Whittington.O.  [facility]  Phone: [phone]           Negative

## 2022-09-18 NOTE — ED PROVIDER NOTE - CLINICAL SUMMARY MEDICAL DECISION MAKING FREE TEXT BOX
Patient is a previously healthy 46-year-old female with no significant past medical or surgical history other than thalassemia trait with anemia status post multiple blood transfusions.  Primary care physician is Dr. Lawrence.  She denies pregnancy.  Not a smoker, not a drinker.  She states she is allergic to codeine when used it required her to have her stomach pumped.  She presents emergency room today with a complaint of nausea and vomiting for the 2 days now better, but now with with watery diarrhea and diffuse abdominal pain.  Pain is nonradiating to the back, not associated with urinary symptoms, not associate with chest pain or shortness of breath.  No fever, no ill contacts, no travel, no raw or unusual foods.    On evaluation is a well-developed well-nourished female no apparent distress HEENT is normal, neck is supple cardiac and lung exam is normal.  Abdominal exam patient has diffuse abdominal tenderness belly is soft to palpation, there is no CVA tenderness, normal borborygmi.  Musculoskeletal exam is normal.  Skin exam is normal.    Plan 46-year-old previously healthy female with diffuse abdominal pain and tenderness with nausea and vomiting and watery diarrhea.  Rule out infectious diarrhea rule out ischemia, treat the pain, rule out other causes for symptoms, labs including CBC chemistry to check electrolytes.  CT scan, IV pain medication.  Patient is refusing IV narcotics will alleviate some of the symptoms with IV Tylenol.

## 2022-09-18 NOTE — ED ADULT TRIAGE NOTE - CHIEF COMPLAINT QUOTE
I have weakness and nauseas since friday.  I did vomit on friday, but I am still nauseas.  I also have diarrhea.  I have severe abdominal pain.  no known sick contact.  I have been unable to eat in two days due to n/v/d. I took 3 covid tests while at home and all were negative.

## 2022-09-18 NOTE — ED PROVIDER NOTE - NSFOLLOWUPINSTRUCTIONS_ED_ALL_ED_FT
FOLLOW UP WITH YOUR GYN DOCTOR.   TAKE ADVIL FOR PAIN.     Ovarian Cyst       An ovarian cyst is a fluid-filled sac on an ovary. Most of these cysts go away on their own and are not cancer. Some cysts need treatment.      What are the causes?    •Ovarian hyperstimulation syndrome. Some medicines may lead to this problem.      •Polycystic ovarian syndrome (PCOS). Problems with body chemicals (hormones) can lead to this condition.      •The normal menstrual cycle.        What increases the risk?    •Being overweight or very overweight.      •Taking medicines to increase your chance of getting pregnant.      •Using some types of birth control.      •Smoking.        What are the signs or symptoms?    Many ovarian cysts do not cause symptoms. If you get symptoms, you may have:  •Pain or pressure in the area between the hip bones.      •Pain in the lower belly.      •Pain during sex.      •Swelling in the lower belly.      •Periods that are not regular.      •Pain with periods.        How is this treated?    Many ovarian cysts go away on their own without treatment. If you need treatment, it may include:  •Medicines for pain.      •Fluid taken out of the cyst.      •The cyst being taken out.      •Birth control pills or other medicines.      •Surgery to remove the ovary.        Follow these instructions at home:    •Take over-the-counter and prescription medicines only as told by your doctor.      •Ask your doctor if you should avoid driving or using machines while you are taking your medicine.      •Get exams and Pap tests as told by your doctor.      •Return to your normal activities when your doctor says that it is safe.      • Do not smoke or use any products that contain nicotine or tobacco. If you need help quitting, ask your doctor.      •Keep all follow-up visits.        Contact a doctor if:  •Your periods:  •Are late.      •Are not regular.      •Stop.      •Are painful.        •You have pain in the area between your hip bones, and the pain does not go away.      •You feel pressure on your bladder.      •You have trouble peeing.      •You feel full, or your belly hurts, swells, or bloats.      •You gain or lose weight without trying, or you are less hungry than normal.      •You feel pain and pressure in your back.      •You feel pain and pressure in the area between your hip bones.      •You think you may be pregnant.        Get help right away if:    •You have pain in your belly that is very bad or gets worse.      •You have pain in the area between your hip bones, and the pain is very bad or gets worse.      •You cannot eat or drink without vomiting.      •You get a fever or chills all of a sudden.      •Your period is a lot heavier than usual.        Summary    •An ovarian cyst is a fluid-filled sac on an ovary.      •Some cysts may cause problems and need treatment.      •Most of these cysts go away on their own.      This information is not intended to replace advice given to you by your health care provider. Make sure you discuss any questions you have with your health care provider.      Document Revised: 05/27/2021 Document Reviewed: 05/27/2021    Elsevier Patient Education © 2022 [employer]

## 2022-09-18 NOTE — ED PROVIDER NOTE - OBJECTIVE STATEMENT
46 female with abdominal pain, nausea, watery diarrhea that began on Friday. Vomiting on Friday now resolved. Denies sick contacts. Ate out on Friday night with others who were fine. No PMHx. No current meds.   Denies fever, chills.   Denies dizziness, lightheadedness.   + abd pain, diffuse, + nausea, + watery diarrhea, +vomiting on Friday night, now resolved. No blood in stool. No black stool.   Denies urinary symptoms. No dysuria, no hematuria.

## 2022-09-18 NOTE — ED PROVIDER NOTE - PATIENT PORTAL LINK FT
[id number] Comprehensive You can access the FollowMyHealth Patient Portal offered by [facility] by registering at the following website: [email or web address]. By joining [employer]’s FollowMyHealth portal, you will also be able to view your health information using other applications (apps) compatible with our system.

## 2022-09-22 ENCOUNTER — EMERGENCY (EMERGENCY)
Facility: HOSPITAL | Age: 47
LOS: 1 days | Discharge: HOME | End: 2022-09-22
Attending: EMERGENCY MEDICINE
Payer: COMMERCIAL

## 2022-09-22 VITALS
RESPIRATION RATE: 19 BRPM | WEIGHT: 166.89 LBS | TEMPERATURE: 99 F | HEIGHT: 65 IN | DIASTOLIC BLOOD PRESSURE: 94 MMHG | OXYGEN SATURATION: 100 % | HEART RATE: 90 BPM | SYSTOLIC BLOOD PRESSURE: 148 MMHG

## 2022-09-22 DIAGNOSIS — Z90.49 ACQUIRED ABSENCE OF OTHER SPECIFIED PARTS OF DIGESTIVE TRACT: Chronic | ICD-10-CM

## 2022-09-22 DIAGNOSIS — Z98.89 OTHER SPECIFIED POSTPROCEDURAL STATES: Chronic | ICD-10-CM

## 2022-09-22 LAB
ALBUMIN SERPL ELPH-MCNC: 3.9 G/DL — SIGNIFICANT CHANGE UP (ref 3.3–5)
ALP SERPL-CCNC: 252 U/L — HIGH (ref 40–120)
ALT FLD-CCNC: 33 U/L — SIGNIFICANT CHANGE UP (ref 10–45)
ANION GAP SERPL CALC-SCNC: 10 MMOL/L — SIGNIFICANT CHANGE UP (ref 5–17)
ANISOCYTOSIS BLD QL: SLIGHT — SIGNIFICANT CHANGE UP
APTT BLD: 45.3 SEC — HIGH (ref 27.5–35.5)
AST SERPL-CCNC: 26 U/L — SIGNIFICANT CHANGE UP (ref 10–40)
BASOPHILS # BLD AUTO: 0 K/UL — SIGNIFICANT CHANGE UP (ref 0–0.2)
BASOPHILS NFR BLD AUTO: 0 % — SIGNIFICANT CHANGE UP (ref 0–2)
BILIRUB SERPL-MCNC: 1.6 MG/DL — HIGH (ref 0.2–1.2)
BLD GP AB SCN SERPL QL: NEGATIVE — SIGNIFICANT CHANGE UP
BUN SERPL-MCNC: 10 MG/DL — SIGNIFICANT CHANGE UP (ref 7–23)
CALCIUM SERPL-MCNC: 9.3 MG/DL — SIGNIFICANT CHANGE UP (ref 8.4–10.5)
CHLORIDE SERPL-SCNC: 103 MMOL/L — SIGNIFICANT CHANGE UP (ref 96–108)
CO2 SERPL-SCNC: 25 MMOL/L — SIGNIFICANT CHANGE UP (ref 22–31)
CREAT SERPL-MCNC: 0.54 MG/DL — SIGNIFICANT CHANGE UP (ref 0.5–1.3)
EGFR: 116 ML/MIN/1.73M2 — SIGNIFICANT CHANGE UP
EOSINOPHIL # BLD AUTO: 0.04 K/UL — SIGNIFICANT CHANGE UP (ref 0–0.5)
EOSINOPHIL NFR BLD AUTO: 0.8 % — SIGNIFICANT CHANGE UP (ref 0–6)
FLUAV AG NPH QL: SIGNIFICANT CHANGE UP
FLUBV AG NPH QL: SIGNIFICANT CHANGE UP
GLUCOSE SERPL-MCNC: 121 MG/DL — HIGH (ref 70–99)
HCG SERPL-ACNC: <2 MIU/ML — SIGNIFICANT CHANGE UP
HCT VFR BLD CALC: 27.8 % — LOW (ref 34.5–45)
HGB BLD-MCNC: 9.1 G/DL — LOW (ref 11.5–15.5)
INR BLD: 1.07 RATIO — SIGNIFICANT CHANGE UP (ref 0.88–1.16)
LYMPHOCYTES # BLD AUTO: 0.67 K/UL — LOW (ref 1–3.3)
LYMPHOCYTES # BLD AUTO: 12.8 % — LOW (ref 13–44)
MANUAL SMEAR VERIFICATION: SIGNIFICANT CHANGE UP
MCHC RBC-ENTMCNC: 30 PG — SIGNIFICANT CHANGE UP (ref 27–34)
MCHC RBC-ENTMCNC: 32.7 GM/DL — SIGNIFICANT CHANGE UP (ref 32–36)
MCV RBC AUTO: 91.7 FL — SIGNIFICANT CHANGE UP (ref 80–100)
METAMYELOCYTES # FLD: 2.6 % — HIGH (ref 0–0)
MONOCYTES # BLD AUTO: 0.22 K/UL — SIGNIFICANT CHANGE UP (ref 0–0.9)
MONOCYTES NFR BLD AUTO: 4.3 % — SIGNIFICANT CHANGE UP (ref 2–14)
MYELOCYTES NFR BLD: 1.7 % — HIGH (ref 0–0)
NEUTROPHILS # BLD AUTO: 4.05 K/UL — SIGNIFICANT CHANGE UP (ref 1.8–7.4)
NEUTROPHILS NFR BLD AUTO: 76.9 % — SIGNIFICANT CHANGE UP (ref 43–77)
NEUTS BAND # BLD: 0.9 % — SIGNIFICANT CHANGE UP (ref 0–8)
PLAT MORPH BLD: NORMAL — SIGNIFICANT CHANGE UP
PLATELET # BLD AUTO: 181 K/UL — SIGNIFICANT CHANGE UP (ref 150–400)
POLYCHROMASIA BLD QL SMEAR: SLIGHT — SIGNIFICANT CHANGE UP
POTASSIUM SERPL-MCNC: 4 MMOL/L — SIGNIFICANT CHANGE UP (ref 3.5–5.3)
POTASSIUM SERPL-SCNC: 4 MMOL/L — SIGNIFICANT CHANGE UP (ref 3.5–5.3)
PROT SERPL-MCNC: 7.1 G/DL — SIGNIFICANT CHANGE UP (ref 6–8.3)
PROTHROM AB SERPL-ACNC: 12.3 SEC — SIGNIFICANT CHANGE UP (ref 10.5–13.4)
RBC # BLD: 3.03 M/UL — LOW (ref 3.8–5.2)
RBC # FLD: 13.2 % — SIGNIFICANT CHANGE UP (ref 10.3–14.5)
RBC BLD AUTO: SIGNIFICANT CHANGE UP
RH IG SCN BLD-IMP: POSITIVE — SIGNIFICANT CHANGE UP
RSV RNA NPH QL NAA+NON-PROBE: SIGNIFICANT CHANGE UP
SARS-COV-2 RNA SPEC QL NAA+PROBE: SIGNIFICANT CHANGE UP
SODIUM SERPL-SCNC: 138 MMOL/L — SIGNIFICANT CHANGE UP (ref 135–145)
WBC # BLD: 5.2 K/UL — SIGNIFICANT CHANGE UP (ref 3.8–10.5)
WBC # FLD AUTO: 5.2 K/UL — SIGNIFICANT CHANGE UP (ref 3.8–10.5)

## 2022-09-22 PROCEDURE — 99285 EMERGENCY DEPT VISIT HI MDM: CPT

## 2022-09-22 PROCEDURE — 76830 TRANSVAGINAL US NON-OB: CPT | Mod: 26

## 2022-09-22 PROCEDURE — 93975 VASCULAR STUDY: CPT | Mod: 26

## 2022-09-22 NOTE — ED PROVIDER NOTE - PHYSICAL EXAMINATION
Yasmeen Hatfield MD  GENERAL: Patient awake alert NAD.  HEENT: NC/AT, Moist mucous membranes, EOMI.  LUNGS: CTAB, no wheezes or crackles.   CARDIAC: RRR, no m/r/g.    ABDOMEN: Soft, +tender LLQ, ND, No rebound, guarding. No CVA tenderness.   EXT: No edema. No calf tenderness.   MSK: no pain with movement, no deformities.  NEURO: A&Ox3. Moving all extremities.  SKIN: Warm and dry. No rash.  PSYCH: Normal affect.

## 2022-09-22 NOTE — ED PROVIDER NOTE - NSFOLLOWUPINSTRUCTIONS_ED_ALL_ED_FT
You were seen in th ED for abdominal pain.    Your workup here was reassuring. Ob gyn assessed you and would like you to follow up outpatient with your ob gyn doctor.    Continue to take Tylenol and Toradol as prescribed.    For severe pain, take Tramadol as prescribed.    Please follow up with your primary care doctor and ob gyn doctor in 2-3 days. Return to the ED if you experience any worsening or new symptoms or any symptoms that concern you, including fevers, chills, shortness of breath, chest pain, worsening abdominal pain, vomiting, vaginal bleeding, pain with urination.

## 2022-09-22 NOTE — ED PROVIDER NOTE - PROGRESS NOTE DETAILS
ob consulted for complex cyst - tubo ovarian abscess vs torsion. will see pt ob thinks cyst is hemorraghic cyst and not TOA or torsion. They recc outpt follow up.  Patient was re-evaluated and doing well. Results, including any incidental findings, were discussed. Return precautions and follow up were discussed. Patient verbalized understanding.  abd pain is improved

## 2022-09-22 NOTE — ED PROVIDER NOTE - RAPID ASSESSMENT
46 F presents to the ED c/o worsening sharp L sided abd pain and vaginal bleeding xfewdays. Pt states she went to the ER on Sunday, was dx'ed w/ an ovarian cyst and dc'ed. Cyst was 5cm at the time. Pt went to outpatient US for cyst which showed it was 6 cm. Pt went to GYN f/u w/ Юлия Daigle yesterday who recommended pt to come to the ED today for complex cyst. Pt took Zofran, Tylenol and Toradol at 12 PM for pain. Pt is well appearing in triage.    **Pt seen in the waiting room via teletriage by Haley Willis (PA), documentation completed by Geo Craig. Pt to be sent to main ED for further evaluation - all orders placed to be followed by MD in the main ED** 46 F presents to the ED c/o worsening sharp L sided abd pain and vaginal bleeding x few days. Pt states she went to the ER on Sunday had CT and US was dx'ed w/ an ovarian cyst and dc'ed. Cyst was 5cm at the time and advised to f/up with GYN. Pt went to outpatient US following day for cyst which showed it was 6 cm. Pt went to GYN f/u w/ Юлия Daigle yesterday who recommended pt to come to the ED today for complex cyst. Pt took Zofran, Tylenol and Toradol at 12 PM for pain. Pt is slightly uncomfortable appearing in triage.    **Pt seen in the waiting room via teletriage by Haley Willis (PA), documentation completed by Geo Craig. Pt to be sent to main ED for further evaluation - all orders placed to be followed by MD in the main ED**    Rapid assessment by Haley Willis PA-C full eval to be performed in ED. Above documentation completed by anel above. I was present for and agree with documentation.   Haley Willis PA-C

## 2022-09-22 NOTE — ED PROVIDER NOTE - ATTENDING CONTRIBUTION TO CARE
Pt with worsening pain LLQ with known complex L ovarian cyst here for eval. Pt in mild distress from pain, td LLQ, no rebound.

## 2022-09-22 NOTE — ED PROVIDER NOTE - CLINICAL SUMMARY MEDICAL DECISION MAKING FREE TEXT BOX
Liu - pt is a 47 yo F presenting with abdominal pain. concern for tubo ovarian abscess vs fibroids vs ectopic vs ovarian torsion. Will check labs, hcg, urine, transvag US. pain control

## 2022-09-22 NOTE — ED PROVIDER NOTE - OBJECTIVE STATEMENT
Pt is a 45 yo F presenting with abd pain since Friday. Pain is worse LLQ but is generally band like across abd. Has had light vag bleeding for 1-2 days. No fevers or chills. No dysuria, hematuria.  Pt went to the ER on Sunday had CT and US was dx'ed w/ an ovarian cyst and dc'ed. Cyst was 5cm at the time and advised to f/up with GYN. Pt went to outpatient US following day for cyst which showed it was 6 cm. Pt went to GYN f/u w/ Юлия Daigle yesterday who recommended pt to come to the ED today for complex cyst. Pt took Zofran, Tylenol and Toradol at 12 PM for pain

## 2022-09-22 NOTE — ED PROVIDER NOTE - PATIENT PORTAL LINK FT
You can access the FollowMyHealth Patient Portal offered by [facility] by registering at the following website: [email or web address]. By joining [employer]’s FollowMyHealth portal, you will also be able to view your health information using other applications (apps) compatible with our system.

## 2022-09-23 VITALS
TEMPERATURE: 99 F | HEART RATE: 83 BPM | RESPIRATION RATE: 18 BRPM | SYSTOLIC BLOOD PRESSURE: 129 MMHG | DIASTOLIC BLOOD PRESSURE: 82 MMHG | OXYGEN SATURATION: 100 %

## 2022-09-23 PROCEDURE — 86901 BLOOD TYPING SEROLOGIC RH(D): CPT

## 2022-09-23 PROCEDURE — 85610 PROTHROMBIN TIME: CPT

## 2022-09-23 PROCEDURE — 84702 CHORIONIC GONADOTROPIN TEST: CPT

## 2022-09-23 PROCEDURE — 85730 THROMBOPLASTIN TIME PARTIAL: CPT

## 2022-09-23 PROCEDURE — 93975 VASCULAR STUDY: CPT

## 2022-09-23 PROCEDURE — 96375 TX/PRO/DX INJ NEW DRUG ADDON: CPT

## 2022-09-23 PROCEDURE — 85025 COMPLETE CBC W/AUTO DIFF WBC: CPT

## 2022-09-23 PROCEDURE — 36415 COLL VENOUS BLD VENIPUNCTURE: CPT

## 2022-09-23 PROCEDURE — 86850 RBC ANTIBODY SCREEN: CPT

## 2022-09-23 PROCEDURE — 87637 SARSCOV2&INF A&B&RSV AMP PRB: CPT

## 2022-09-23 PROCEDURE — 76830 TRANSVAGINAL US NON-OB: CPT

## 2022-09-23 PROCEDURE — 80053 COMPREHEN METABOLIC PANEL: CPT

## 2022-09-23 PROCEDURE — 99285 EMERGENCY DEPT VISIT HI MDM: CPT | Mod: 25

## 2022-09-23 PROCEDURE — 96374 THER/PROPH/DIAG INJ IV PUSH: CPT

## 2022-09-23 PROCEDURE — 86900 BLOOD TYPING SEROLOGIC ABO: CPT

## 2022-09-23 RX ORDER — ONDANSETRON 8 MG/1
4 TABLET, FILM COATED ORAL ONCE
Refills: 0 | Status: COMPLETED | OUTPATIENT
Start: 2022-09-23 | End: 2022-09-23

## 2022-09-23 RX ORDER — TRAMADOL HYDROCHLORIDE 50 MG/1
1 TABLET ORAL
Qty: 6 | Refills: 0
Start: 2022-09-23 | End: 2022-09-24

## 2022-09-23 RX ORDER — KETOROLAC TROMETHAMINE 30 MG/ML
15 SYRINGE (ML) INJECTION ONCE
Refills: 0 | Status: DISCONTINUED | OUTPATIENT
Start: 2022-09-23 | End: 2022-09-23

## 2022-09-23 RX ORDER — ACETAMINOPHEN 500 MG
650 TABLET ORAL ONCE
Refills: 0 | Status: COMPLETED | OUTPATIENT
Start: 2022-09-23 | End: 2022-09-23

## 2022-09-23 RX ADMIN — Medication 650 MILLIGRAM(S): at 01:22

## 2022-09-23 RX ADMIN — Medication 15 MILLIGRAM(S): at 01:21

## 2022-09-23 RX ADMIN — ONDANSETRON 4 MILLIGRAM(S): 8 TABLET, FILM COATED ORAL at 02:06

## 2022-09-23 NOTE — CONSULT NOTE ADULT - ASSESSMENT
47yo  LMP  coming in with worsening lower left abdominal pain with known left ovarian cyst which appears stable in size. Patient is hemodynamically stable without an acute abdomen or evidence of infection.    # Abdominal pain  - Likely 2/2 left ovarian cyst which is stable in size. Radiology resident estimates 5-6cm cyst on today's TVUS which measured 6.2cm on Wednesday.  - Low suspicion for torsion given patient's exam and improvement in pain with Tylenol and Toradol even if it is less than before. Torsion precautions reviewed with patient.  - TVUS shows a questionable thick-walled tubular component possibly representing TOA but patient does not have a fever, leukocytosis, or abnormal discharge.  - Recommend follow up in the office to monitor size of cyst. Patient interested in surgical management which she may schedule with her OBGYN.    # Vaginal bleeding  - Patient has had irregular menses for the past year, likely having another episode of menses.  - No active bleeding on speculum exam, VSS  - H/H 9.1/27.8, recommend oral iron pills once a day with Vitamin C    d/w Dr. Hernando Bosch  PGY-2 47yo  LMP  coming in with worsening lower left abdominal pain with known left ovarian cyst which appears stable in size. Patient is hemodynamically stable without an acute abdomen or evidence of infection.    # Abdominal pain  - Likely 2/2 left ovarian cyst which is stable in size. Radiology resident estimates 5-6cm cyst on today's TVUS which measured 6.2cm on Wednesday.  - Low suspicion for torsion given patient's exam and improvement in pain with Tylenol and Toradol even if it is less than before. Torsion precautions reviewed with patient.  - TVUS shows a questionable thick-walled tubular component possibly representing TOA but patient does not have a fever, leukocytosis, or abnormal discharge. Low concern for TOA.  - Recommend follow up in the office to monitor size of cyst. Patient interested in surgical management which she may schedule with her OBGYN.  - No acute GYN intervention indicated    # Vaginal bleeding  - Patient has had irregular menses for the past year, likely having another episode of menses.  - No active bleeding on speculum exam, VSS  - H/H 9.1/27.8, recommend oral iron pills once a day with Vitamin C    d/w Dr. Hernando Bosch  PGY-2

## 2022-09-23 NOTE — CONSULT NOTE ADULT - SUBJECTIVE AND OBJECTIVE BOX
COMFORT FUENTES  46y  Female [id number]    HPI: 47yo  LMP 9/4 coming in with lower left quadrant abdominal pain since Friday which has gotten worse. Patient went to [place] on  and had a TVUS and CTAP which showed a left "3.9cm left ovarian cyst with low level internal echos, likely hemorrhagic content". Patient then followed up with her OBGYN on Monday and was told to call the office if the pain worsened. Pain did worsen on Wednesday and she was sent to [facility] Radiology where a repeat TVUS showed "questionable adenomyosis. The left ovary is heterogenous and is occupied by a complex mass with increased vascularity 6.2x4.6x5cm". Patient reports that pain increased yesterday which is why she is here in the hospital. Tylenol and Motrin are still helping with the pain but not as much as before. Repeat TVUS here also shows a complex left ovarian cyst which was not measured. Radiology resident was called and gave an estimate of 5-6cm. The pain is sharp and occasionally stabbing in nature and is worse with movement. She endorses nausea, no vomiting. She reports one day of malodorous discharge on Tuesday which has since cleared up. She also reports vaginal bleeding since Friday and changing a small pad every 30 min. Denies feeling lightheaded, dizzy, CP or SOB. Her periods have been irregular for the past year.    Name of GYN Physician: Dr. Villagomez    POB: NSVDx3, C/Sx1, 4 miscarriages, 1 eTOP, D&Cx1   Pgyn: Denies fibroids, endometriosis, STI's, abnormal pap smears   PMH: Denies  PSH: appendectomy , cholecystectomy   Meds: Denies  All: Codeine, Iodine  SH: Denies smoking use, drug use, alcohol use.     Hospital Meds:   MEDICATIONS  (STANDING):    MEDICATIONS  (PRN):  Tylenol  Toradol    Vital Signs Last 24 Hrs  T(C): 36.9 (23 Sep 2022 00:09), Max: 37.2 (22 Sep 2022 19:50)  T(F): 98.4 (23 Sep 2022 00:09), Max: 99 (22 Sep 2022 19:50)  HR: 85 (23 Sep 2022 00:09) (85 - 90)  BP: 139/84 (23 Sep 2022 00:09) (139/84 - 148/94)  BP(mean): --  RR: 18 (23 Sep 2022 00:09) (18 - 19)  SpO2: 98% (23 Sep 2022 00:09) (98% - 100%)    Parameters below as of 23 Sep 2022 00:09  Patient On (Oxygen Delivery Method): room air      Chaperone: SHARI Arias  Physical Exam:   General: sitting comfortably in bed, NAD   HEENT: neck supple, full ROM  CV: RR S1, S2 no m/r/g  Lungs: CTA b/l, good air flow b/l   Back: No CVA tenderness  Abd: Soft, moderately tender in LLQ, no rebound or guarding, non-distended.  Bowel sounds present.    :  Moderate bleeding on small pad in place for the past 30 min.  External labia wnl.  Bimanual exam with moderate cervical motion tenderness and tenderness in left and right fornix. Uterus wnl, adnexa non palpable b/l. Cervix closed  Speculum Exam: No active bleeding from os. Minimal dark blood in vagina. No abnormal discharge noted.   Ext: non-tender b/l, no edema     LABS:                            9.1    5.20  )-----------( 181      ( 22 Sep 2022 21:51 )             27.8         138  |  103  |  10  ----------------------------<  121<H>  4.0   |  25  |  0.54    Ca    9.3      22 Sep 2022 21:51    TPro  7.1  /  Alb  3.9  /  TBili  1.6<H>  /  DBili  x   /  AST  26  /  ALT  33  /  AlkPhos  252<H>      I&O's Detail    PT/INR - ( 22 Sep 2022 21:51 )   PT: 12.3 sec;   INR: 1.07 ratio         PTT - ( 22 Sep 2022 21:51 )  PTT:45.3 sec      RADIOLOGY & ADDITIONAL STUDIES:        < from: US Transvaginal (22 @ 23:05) >  ACC: [id number] EXAM:  US DPLX PELVIC                        ACC: [id number] EXAM:  US TRANSVAGINAL                          PROCEDURE DATE:  2022          INTERPRETATION:  CLINICAL INFORMATION: Known left ovarian cyst now with   increased pain.    LMP: 2022    COMPARISON: Comparison is made with prior imaging from [facility]   on 2022 (CT abdomen and pelvis accession #[id number], pelvic   ultrasound accession [id number]).    TECHNIQUE:  Endovaginal pelvic sonogram only. Color and Spectral Doppler was   performed.    FINDINGS:  Uterus: 8.5 cm x 6.6 cm x 5.6 cm. Within normal limits.  Endometrium: 12 mm. Within normal limits.    Right ovary: 3.4 cm x 2.3 cm x 2.2 cm. 1.8 cm corpus luteum. Normal   arterial and venous waveforms.    Left ovary: Normal appearing left ovary is not identified. Complex   structure is noted in the left adnexa with vascular components and   avascular complex cystic components with internal echoes. A questionable   thick-walled tubular component is also identified.    Fluid: Small free fluid.    IMPRESSION:  Normal appearing left ovary not identified. Complex structure in left   adnexa with vascular components, avascular complex cystic components and   questionable thick-walled tubular component. Findings could reflect   enlarged ovary containing hemorrhagic cyst/cysts. Differential diagnosis   includes tubo-ovarian abscess given the possible tubular component.   Ovarian torsion should be excluded on clinical grounds given the ovarian   enlargement. Contrast enhanced MRI should be considered for further   characterization.        --- End of Report ---           ALFRED LEPE MD; Resident Radiologist  This document has been electronically signed.  ANISHA VARGAS MD; Attending Radiologist  This document has been electronically signed. Sep 23 2022 12:27AM    < end of copied text >   COMFORT FUENTES  46y  Female [id number]    HPI: 47yo  LMP 9/4 coming in with lower left quadrant abdominal pain since Friday which has gotten worse. Patient went to [place] on  and had a TVUS and CTAP which showed a left "3.9cm left ovarian cyst with low level internal echos, likely hemorrhagic content". Patient then followed up with her OBGYN on Monday and was told to call the office if the pain worsened. Pain did worsen on Wednesday and she was sent to [facility] Radiology where a repeat TVUS showed "questionable adenomyosis. The left ovary is heterogenous and is occupied by a complex mass with increased vascularity 6.2x4.6x5cm". Patient reports that pain increased yesterday which is why she is here in the hospital. Tylenol and Motrin are still helping with the pain but not as much as before. Repeat TVUS here also shows a complex left ovarian cyst which was not measured. Radiology resident was called and gave an estimate of 5-6cm. The pain is sharp and occasionally stabbing in nature and is worse with movement. She endorses nausea, no vomiting. She reports one day of malodorous discharge on Tuesday which has since cleared up. She also reports vaginal bleeding since Friday and changing a small pad every 30 min. Denies feeling lightheaded, dizzy, CP or SOB. Her periods have been irregular for the past year.    Name of GYN Physician: Dr. Villagomez    POB: NSVDx3, C/Sx1, 4 miscarriages, 1 eTOP, D&Cx1   Pgyn: Denies fibroids, endometriosis, STI's, abnormal pap smears   PMH: Denies  PSH: ?right ovarian dermoid cystectomy , appendectomy , cholecystectomy   Meds: Denies  All: Codeine, Iodine  SH: Denies smoking use, drug use, alcohol use.     Hospital Meds:   MEDICATIONS  (STANDING):    MEDICATIONS  (PRN):  Tylenol  Toradol    Vital Signs Last 24 Hrs  T(C): 36.9 (23 Sep 2022 00:09), Max: 37.2 (22 Sep 2022 19:50)  T(F): 98.4 (23 Sep 2022 00:09), Max: 99 (22 Sep 2022 19:50)  HR: 85 (23 Sep 2022 00:09) (85 - 90)  BP: 139/84 (23 Sep 2022 00:09) (139/84 - 148/94)  BP(mean): --  RR: 18 (23 Sep 2022 00:09) (18 - 19)  SpO2: 98% (23 Sep 2022 00:09) (98% - 100%)    Parameters below as of 23 Sep 2022 00:09  Patient On (Oxygen Delivery Method): room air      Chaperone: SHARI Arias  Physical Exam:   General: sitting comfortably in bed, NAD   HEENT: neck supple, full ROM  CV: RR S1, S2 no m/r/g  Lungs: CTA b/l, good air flow b/l   Back: No CVA tenderness  Abd: Soft, moderately tender in LLQ, no rebound or guarding, non-distended.  Bowel sounds present.    :  Moderate bleeding on small pad in place for the past 30 min.  External labia wnl.  Bimanual exam with moderate cervical motion tenderness and tenderness in left and right fornix. Uterus wnl, adnexa non palpable b/l. Cervix closed  Speculum Exam: No active bleeding from os. Minimal dark blood in vagina. No abnormal discharge noted.   Ext: non-tender b/l, no edema     LABS:                            9.1    5.20  )-----------( 181      ( 22 Sep 2022 21:51 )             27.8         138  |  103  |  10  ----------------------------<  121<H>  4.0   |  25  |  0.54    Ca    9.3      22 Sep 2022 21:51    TPro  7.1  /  Alb  3.9  /  TBili  1.6<H>  /  DBili  x   /  AST  26  /  ALT  33  /  AlkPhos  252<H>      I&O's Detail    PT/INR - ( 22 Sep 2022 21:51 )   PT: 12.3 sec;   INR: 1.07 ratio         PTT - ( 22 Sep 2022 21:51 )  PTT:45.3 sec      RADIOLOGY & ADDITIONAL STUDIES:        < from: US Transvaginal (22 @ 23:05) >  ACC: [id number] EXAM:  US DPLX PELVIC                        ACC: [id number] EXAM:  US TRANSVAGINAL                          PROCEDURE DATE:  2022          INTERPRETATION:  CLINICAL INFORMATION: Known left ovarian cyst now with   increased pain.    LMP: 2022    COMPARISON: Comparison is made with prior imaging from [facility]   on 2022 (CT abdomen and pelvis accession #[id number], pelvic   ultrasound accession [id number]).    TECHNIQUE:  Endovaginal pelvic sonogram only. Color and Spectral Doppler was   performed.    FINDINGS:  Uterus: 8.5 cm x 6.6 cm x 5.6 cm. Within normal limits.  Endometrium: 12 mm. Within normal limits.    Right ovary: 3.4 cm x 2.3 cm x 2.2 cm. 1.8 cm corpus luteum. Normal   arterial and venous waveforms.    Left ovary: Normal appearing left ovary is not identified. Complex   structure is noted in the left adnexa with vascular components and   avascular complex cystic components with internal echoes. A questionable   thick-walled tubular component is also identified.    Fluid: Small free fluid.    IMPRESSION:  Normal appearing left ovary not identified. Complex structure in left   adnexa with vascular components, avascular complex cystic components and   questionable thick-walled tubular component. Findings could reflect   enlarged ovary containing hemorrhagic cyst/cysts. Differential diagnosis   includes tubo-ovarian abscess given the possible tubular component.   Ovarian torsion should be excluded on clinical grounds given the ovarian   enlargement. Contrast enhanced MRI should be considered for further   characterization.        --- End of Report ---           ALFRED LEPE MD; Resident Radiologist  This document has been electronically signed.  ANISHA VARGAS MD; Attending Radiologist  This document has been electronically signed. Sep 23 2022 12:27AM    < end of copied text >

## 2022-09-23 NOTE — ED ADULT NURSE NOTE - OBJECTIVE STATEMENT
46 y.o female, A&Ox4, no PMH, pt presents to ED c/o abdominal pian. pt states she has been having ongoing abdominal pain x1 week, states the pain is primarily to the left lower quadrant but radiates across her abdomen and down her pelvic area. pt states she was seen by OB where ultrasound was performed states she was found to have complex ovarian cysts. pt states she got another ultrasound done as the pain was not improving states the cyst increased in size, pt states she has been having some puss like vaginal bleeding, OB advised pt come to ED for further evaluation. Pt denies chest pain, sob, fever, chills. IV placed by qdoc RN, pt safety and comfort provided.

## 2022-09-26 PROBLEM — N94.89 ADNEXAL MASS: Status: ACTIVE | Noted: 2022-09-26

## 2022-09-26 PROBLEM — N88.8 CERVICAL MASS: Status: ACTIVE | Noted: 2022-09-26

## 2022-09-27 ENCOUNTER — NON-APPOINTMENT (OUTPATIENT)
Age: 47
End: 2022-09-27

## 2022-09-27 ENCOUNTER — APPOINTMENT (OUTPATIENT)
Dept: GYNECOLOGIC ONCOLOGY | Facility: CLINIC | Age: 47
End: 2022-09-27

## 2022-09-27 DIAGNOSIS — N94.89 OTHER SPECIFIED CONDITIONS ASSOCIATED WITH FEMALE GENITAL ORGANS AND MENSTRUAL CYCLE: ICD-10-CM

## 2022-09-27 DIAGNOSIS — N88.8 OTHER SPECIFIED NONINFLAMMATORY DISORDERS OF CERVIX UTERI: ICD-10-CM

## 2022-10-11 ENCOUNTER — NON-APPOINTMENT (OUTPATIENT)
Age: 47
End: 2022-10-11

## 2022-10-30 ENCOUNTER — EMERGENCY (EMERGENCY)
Facility: HOSPITAL | Age: 47
LOS: 1 days | Discharge: HOME | End: 2022-10-30
Attending: INTERNAL MEDICINE | Admitting: INTERNAL MEDICINE
Payer: COMMERCIAL

## 2022-10-30 VITALS
SYSTOLIC BLOOD PRESSURE: 127 MMHG | HEIGHT: 65 IN | HEART RATE: 118 BPM | RESPIRATION RATE: 26 BRPM | WEIGHT: 158.07 LBS | TEMPERATURE: 98 F | DIASTOLIC BLOOD PRESSURE: 80 MMHG | OXYGEN SATURATION: 97 %

## 2022-10-30 DIAGNOSIS — Z90.49 ACQUIRED ABSENCE OF OTHER SPECIFIED PARTS OF DIGESTIVE TRACT: Chronic | ICD-10-CM

## 2022-10-30 DIAGNOSIS — Z98.89 OTHER SPECIFIED POSTPROCEDURAL STATES: Chronic | ICD-10-CM

## 2022-10-30 PROCEDURE — 99285 EMERGENCY DEPT VISIT HI MDM: CPT

## 2022-10-30 NOTE — ED PROVIDER NOTE - SIGNIFICANT NEGATIVE FINDINGS
no headache, no exertional chest pain, no SOB, no palpitations, no fever, no chills, no n/v, no neuro changes.

## 2022-10-30 NOTE — ED PROVIDER NOTE - NSICDXPASTSURGICALHX_GEN_ALL_CORE_FT
PAST SURGICAL HISTORY:  History of appendectomy     No significant past surgical history     S/P appendectomy

## 2022-10-30 NOTE — ED ADULT TRIAGE NOTE - CHIEF COMPLAINT QUOTE
Pt c/o cough x 3.5 weeks, states she was seen at  and finished ABX and steroids. Pt states her chest hurt from coughing

## 2022-10-30 NOTE — ED PROVIDER NOTE - NSFOLLOWUPINSTRUCTIONS_ED_ALL_ED_FT
Hyper active airway disease occurs after an upper air way infection, the upper airways remained inflamed and cough can persist.  we are treating you with a new Antibiotic, Duo inhalation nebulizers, cough pills and the steroid pack  Please follow up with the pulmonologist provided

## 2022-10-30 NOTE — ED PROVIDER NOTE - OBJECTIVE STATEMENT
Pt c/o cough x 3.5 weeks, states she was seen at  and finished ABX and steroids. Pt states her chest hurt from coughing  cough Pt c/o cough x 3.5 weeks, states she was seen at  and finished ABX and steroids. Pt states her chest hurt from coughing  cough  thalassemia 45 y/o female Pt c/o cough x 3.5 weeks, states she was seen at  and finished ABX and steroids. Pt states her chest hurt from coughing  h/o thalassemia. no headache, no exertional chest pain, no SOB, no palpitations, no fever, no chills, no n/v, no neuro changes.

## 2022-10-30 NOTE — ED PROVIDER NOTE - CARE PROVIDER_API CALL
Luis A Grossman)  Internal Medicine; Pulmonary Disease  [address] [place] [address]  Phone: [phone]  Fax: [phone]  Follow Up Time: 1-3 Days

## 2022-10-30 NOTE — ED PROVIDER NOTE - CLINICAL SUMMARY MEDICAL DECISION MAKING FREE TEXT BOX
Hyper active airway disease due to  upper air way infection CXR negative PCR negative  Rx doxycycline, Duo inhalation nebulizers, cough pills and the steroid pack  Please follow up with the pulmonologist provided

## 2022-10-30 NOTE — ED PROVIDER NOTE - PATIENT PORTAL LINK FT
You can access the FollowMyHealth Patient Portal offered by [facility] by registering at the following website: [email or web address]. By joining [employer]’s FollowMyHealth portal, you will also be able to view your health information using other applications (apps) compatible with our system.

## 2022-10-30 NOTE — ED PROVIDER NOTE - NSICDXPASTMEDICALHX_GEN_ALL_CORE_FT
PAST MEDICAL HISTORY:  Anemia     No pertinent past medical history     No pertinent past medical history     Thalassemia, minor

## 2022-10-31 VITALS
TEMPERATURE: 98 F | HEART RATE: 93 BPM | DIASTOLIC BLOOD PRESSURE: 73 MMHG | SYSTOLIC BLOOD PRESSURE: 113 MMHG | RESPIRATION RATE: 19 BRPM | OXYGEN SATURATION: 93 %

## 2022-10-31 PROBLEM — Z78.9 OTHER SPECIFIED HEALTH STATUS: Chronic | Status: ACTIVE | Noted: 2022-09-23

## 2022-10-31 LAB
ALBUMIN SERPL ELPH-MCNC: 3.3 G/DL — SIGNIFICANT CHANGE UP (ref 3.3–5)
ALP SERPL-CCNC: 109 U/L — SIGNIFICANT CHANGE UP (ref 40–120)
ALT FLD-CCNC: 25 U/L — SIGNIFICANT CHANGE UP (ref 12–78)
ANION GAP SERPL CALC-SCNC: 8 MMOL/L — SIGNIFICANT CHANGE UP (ref 5–17)
AST SERPL-CCNC: 17 U/L — SIGNIFICANT CHANGE UP (ref 15–37)
BILIRUB SERPL-MCNC: 0.7 MG/DL — SIGNIFICANT CHANGE UP (ref 0.2–1.2)
BUN SERPL-MCNC: 6 MG/DL — LOW (ref 7–23)
CALCIUM SERPL-MCNC: 8.7 MG/DL — SIGNIFICANT CHANGE UP (ref 8.5–10.1)
CHLORIDE SERPL-SCNC: 105 MMOL/L — SIGNIFICANT CHANGE UP (ref 96–108)
CO2 SERPL-SCNC: 26 MMOL/L — SIGNIFICANT CHANGE UP (ref 22–31)
CREAT SERPL-MCNC: 0.54 MG/DL — SIGNIFICANT CHANGE UP (ref 0.5–1.3)
D DIMER BLD IA.RAPID-MCNC: 380 NG/ML DDU — HIGH
EGFR: 116 ML/MIN/1.73M2 — SIGNIFICANT CHANGE UP
FLUAV AG NPH QL: SIGNIFICANT CHANGE UP
FLUBV AG NPH QL: SIGNIFICANT CHANGE UP
GLUCOSE SERPL-MCNC: 137 MG/DL — HIGH (ref 70–99)
HCG SERPL-ACNC: <1 MIU/ML — SIGNIFICANT CHANGE UP
HCT VFR BLD CALC: 28.5 % — LOW (ref 34.5–45)
HGB BLD-MCNC: 9.2 G/DL — LOW (ref 11.5–15.5)
MCHC RBC-ENTMCNC: 29.4 PG — SIGNIFICANT CHANGE UP (ref 27–34)
MCHC RBC-ENTMCNC: 32.3 GM/DL — SIGNIFICANT CHANGE UP (ref 32–36)
MCV RBC AUTO: 91.1 FL — SIGNIFICANT CHANGE UP (ref 80–100)
NRBC # BLD: 0 /100 WBCS — SIGNIFICANT CHANGE UP (ref 0–0)
PLATELET # BLD AUTO: 198 K/UL — SIGNIFICANT CHANGE UP (ref 150–400)
POTASSIUM SERPL-MCNC: 3.8 MMOL/L — SIGNIFICANT CHANGE UP (ref 3.5–5.3)
POTASSIUM SERPL-SCNC: 3.8 MMOL/L — SIGNIFICANT CHANGE UP (ref 3.5–5.3)
PROT SERPL-MCNC: 7.1 G/DL — SIGNIFICANT CHANGE UP (ref 6–8.3)
RBC # BLD: 3.13 M/UL — LOW (ref 3.8–5.2)
RBC # FLD: 14.1 % — SIGNIFICANT CHANGE UP (ref 10.3–14.5)
RSV RNA NPH QL NAA+NON-PROBE: SIGNIFICANT CHANGE UP
SARS-COV-2 RNA SPEC QL NAA+PROBE: SIGNIFICANT CHANGE UP
SODIUM SERPL-SCNC: 139 MMOL/L — SIGNIFICANT CHANGE UP (ref 135–145)
WBC # BLD: 10.35 K/UL — SIGNIFICANT CHANGE UP (ref 3.8–10.5)
WBC # FLD AUTO: 10.35 K/UL — SIGNIFICANT CHANGE UP (ref 3.8–10.5)

## 2022-10-31 PROCEDURE — 71045 X-RAY EXAM CHEST 1 VIEW: CPT | Mod: 26

## 2022-10-31 PROCEDURE — 93010 ELECTROCARDIOGRAM REPORT: CPT

## 2022-10-31 PROCEDURE — 93970 EXTREMITY STUDY: CPT | Mod: 26

## 2022-10-31 RX ORDER — IPRATROPIUM/ALBUTEROL SULFATE 18-103MCG
3 AEROSOL WITH ADAPTER (GRAM) INHALATION
Qty: 40 | Refills: 0
Start: 2022-10-31 | End: 2022-11-09

## 2022-10-31 RX ORDER — IPRATROPIUM/ALBUTEROL SULFATE 18-103MCG
3 AEROSOL WITH ADAPTER (GRAM) INHALATION ONCE
Refills: 0 | Status: COMPLETED | OUTPATIENT
Start: 2022-10-31 | End: 2022-10-31

## 2022-10-31 RX ORDER — KETOROLAC TROMETHAMINE 30 MG/ML
30 SYRINGE (ML) INJECTION ONCE
Refills: 0 | Status: DISCONTINUED | OUTPATIENT
Start: 2022-10-31 | End: 2022-10-31

## 2022-10-31 RX ADMIN — Medication 30 MILLIGRAM(S): at 00:39

## 2022-10-31 RX ADMIN — Medication 100 MILLIGRAM(S): at 00:39

## 2022-10-31 RX ADMIN — Medication 3 MILLILITER(S): at 00:39

## 2022-10-31 RX ADMIN — Medication 100 MILLIGRAM(S): at 03:37

## 2022-10-31 RX ADMIN — Medication 125 MILLIGRAM(S): at 00:38

## 2022-10-31 NOTE — ED ADULT NURSE NOTE - NSIMPLEMENTINTERV_GEN_ALL_ED
Implemented All Universal Safety Interventions:  [place] to call system. Call bell, personal items and telephone within reach. Instruct patient to call for assistance. Room bathroom lighting operational. Non-slip footwear when patient is off stretcher. Physically safe environment: no spills, clutter or unnecessary equipment. Stretcher in lowest position, wheels locked, appropriate side rails in place.

## 2022-11-16 PROCEDURE — 82962 GLUCOSE BLOOD TEST: CPT

## 2022-11-16 PROCEDURE — 93970 EXTREMITY STUDY: CPT

## 2022-11-16 PROCEDURE — 84702 CHORIONIC GONADOTROPIN TEST: CPT

## 2022-11-16 PROCEDURE — 96374 THER/PROPH/DIAG INJ IV PUSH: CPT

## 2022-11-16 PROCEDURE — 36415 COLL VENOUS BLD VENIPUNCTURE: CPT

## 2022-11-16 PROCEDURE — 71045 X-RAY EXAM CHEST 1 VIEW: CPT

## 2022-11-16 PROCEDURE — 96375 TX/PRO/DX INJ NEW DRUG ADDON: CPT

## 2022-11-16 PROCEDURE — 94640 AIRWAY INHALATION TREATMENT: CPT

## 2022-11-16 PROCEDURE — 93005 ELECTROCARDIOGRAM TRACING: CPT

## 2022-11-16 PROCEDURE — 87637 SARSCOV2&INF A&B&RSV AMP PRB: CPT

## 2022-11-16 PROCEDURE — 85379 FIBRIN DEGRADATION QUANT: CPT

## 2022-11-16 PROCEDURE — 99285 EMERGENCY DEPT VISIT HI MDM: CPT | Mod: 25

## 2022-11-16 PROCEDURE — 80053 COMPREHEN METABOLIC PANEL: CPT

## 2022-11-16 PROCEDURE — 85027 COMPLETE CBC AUTOMATED: CPT

## 2022-12-07 ENCOUNTER — APPOINTMENT (OUTPATIENT)
Dept: OBGYN | Facility: CLINIC | Age: 47
End: 2022-12-07

## 2022-12-09 ENCOUNTER — APPOINTMENT (OUTPATIENT)
Dept: OBGYN | Facility: CLINIC | Age: 47
End: 2022-12-09

## 2022-12-09 VITALS
BODY MASS INDEX: 28.48 KG/M2 | HEIGHT: 65.5 IN | HEART RATE: 77 BPM | WEIGHT: 173 LBS | SYSTOLIC BLOOD PRESSURE: 134 MMHG | DIASTOLIC BLOOD PRESSURE: 84 MMHG

## 2022-12-09 PROCEDURE — 99203 OFFICE O/P NEW LOW 30 MIN: CPT

## 2022-12-19 ENCOUNTER — APPOINTMENT (OUTPATIENT)
Dept: OBGYN | Facility: CLINIC | Age: 47
End: 2022-12-19

## 2022-12-19 NOTE — HISTORY OF PRESENT ILLNESS
[FreeTextEntry1] : 48 y/o female P4 ( x 3, LTCS x1), LMP 22, regular. patient has LLQ pain x 6 months,. had CT and sono that shows left adnexal mass. hemorrhagic cyst vs hydrosalpinx\par \par last pap within \par \par PSH ltcs, cholecystectomy, ovarian cystectomy (dermoid), appendectomy\par PMH: asthma, carrier for alpha thal, anemia\par

## 2023-01-03 ENCOUNTER — APPOINTMENT (OUTPATIENT)
Dept: OBGYN | Facility: CLINIC | Age: 48
End: 2023-01-03

## 2023-01-13 ENCOUNTER — APPOINTMENT (OUTPATIENT)
Dept: OBGYN | Facility: CLINIC | Age: 48
End: 2023-01-13

## 2023-01-26 ENCOUNTER — ASOB RESULT (OUTPATIENT)
Age: 48
End: 2023-01-26

## 2023-01-26 ENCOUNTER — APPOINTMENT (OUTPATIENT)
Dept: OBGYN | Facility: CLINIC | Age: 48
End: 2023-01-26
Payer: COMMERCIAL

## 2023-01-26 VITALS
SYSTOLIC BLOOD PRESSURE: 146 MMHG | HEIGHT: 65.5 IN | DIASTOLIC BLOOD PRESSURE: 87 MMHG | BODY MASS INDEX: 28.48 KG/M2 | WEIGHT: 173 LBS | HEART RATE: 80 BPM

## 2023-01-26 DIAGNOSIS — R82.90 UNSPECIFIED ABNORMAL FINDINGS IN URINE: ICD-10-CM

## 2023-01-26 DIAGNOSIS — D21.9 BENIGN NEOPLASM OF CONNECTIVE AND OTHER SOFT TISSUE, UNSPECIFIED: ICD-10-CM

## 2023-01-26 DIAGNOSIS — N39.0 URINARY TRACT INFECTION, SITE NOT SPECIFIED: ICD-10-CM

## 2023-01-26 DIAGNOSIS — N93.9 ABNORMAL UTERINE AND VAGINAL BLEEDING, UNSPECIFIED: ICD-10-CM

## 2023-01-26 LAB
HCG UR QL: NEGATIVE
QUALITY CONTROL: YES

## 2023-01-26 PROCEDURE — 58100 BIOPSY OF UTERUS LINING: CPT

## 2023-01-26 PROCEDURE — 81025 URINE PREGNANCY TEST: CPT

## 2023-01-26 PROCEDURE — 76830 TRANSVAGINAL US NON-OB: CPT

## 2023-01-26 PROCEDURE — 99213 OFFICE O/P EST LOW 20 MIN: CPT | Mod: 25

## 2023-01-30 PROBLEM — N39.0 ACUTE UTI: Status: RESOLVED | Noted: 2023-01-30 | Resolved: 2023-03-01

## 2023-01-30 RX ORDER — NITROFURANTOIN MACROCRYSTALS 100 MG/1
100 CAPSULE ORAL
Qty: 14 | Refills: 0 | Status: ACTIVE | COMMUNITY
Start: 2023-01-30 | End: 1900-01-01

## 2023-02-06 LAB — CORE LAB BIOPSY: NORMAL

## 2023-02-07 RX ORDER — NORETHINDRONE ACETATE AND ETHINYL ESTRADIOL 1.5; 3 MG/1; UG/1
1.5-3 TABLET ORAL
Qty: 3 | Refills: 3 | Status: DISCONTINUED | COMMUNITY
Start: 2023-01-26 | End: 2023-02-07

## 2023-02-14 RX ORDER — NORETHINDRONE ACETATE AND ETHINYL ESTRADIOL 1; 20 MG/1; UG/1
1-20 TABLET ORAL DAILY
Qty: 3 | Refills: 2 | Status: ACTIVE | COMMUNITY
Start: 2023-02-07 | End: 1900-01-01

## 2023-02-14 RX ORDER — NORETHINDRONE ACETATE AND ETHINYL ESTRADIOL 1; 20 MG/1; UG/1
1-20 TABLET ORAL DAILY
Qty: 90 | Refills: 3 | Status: ACTIVE | COMMUNITY
Start: 2023-02-14 | End: 1900-01-01

## 2023-04-13 ENCOUNTER — APPOINTMENT (OUTPATIENT)
Dept: ORTHOPEDIC SURGERY | Facility: CLINIC | Age: 48
End: 2023-04-13
Payer: COMMERCIAL

## 2023-04-13 VITALS — HEIGHT: 65.5 IN | WEIGHT: 168 LBS | BODY MASS INDEX: 27.65 KG/M2

## 2023-04-13 DIAGNOSIS — Z78.9 OTHER SPECIFIED HEALTH STATUS: ICD-10-CM

## 2023-04-13 DIAGNOSIS — Z00.00 ENCOUNTER FOR GENERAL ADULT MEDICAL EXAMINATION W/OUT ABNORMAL FINDINGS: ICD-10-CM

## 2023-04-13 DIAGNOSIS — Z86.2 PERSONAL HISTORY OF DISEASES OF THE BLOOD AND BLOOD-FORMING ORGANS AND CERTAIN DISORDERS INVOLVING THE IMMUNE MECHANISM: ICD-10-CM

## 2023-04-13 PROCEDURE — 73564 X-RAY EXAM KNEE 4 OR MORE: CPT | Mod: LT

## 2023-04-13 PROCEDURE — 99203 OFFICE O/P NEW LOW 30 MIN: CPT

## 2023-04-13 NOTE — DISCUSSION/SUMMARY
[de-identified] : 46yo f with left knee pain s/p fall 1 month ago with persistent pain\par 1) MRI to r/o occult fracture MFC\par 2) cryotherapy\par 3) NSAIDs as neeeded\par 4) rtc after MRI

## 2023-04-13 NOTE — HISTORY OF PRESENT ILLNESS
[Sudden] : sudden [7] : 7 [0] : 0 [Sharp] : sharp [Occasional] : occasional [Sleep] : sleep [Full time] : Work status: full time [de-identified] : 04/13/2023 Ms. COMFORT JACOBS, a 47 year old female, presents today for left knee pain. Reports hx of a fall about 1 month ago with persistent pain. \par  [] : no [FreeTextEntry1] : Left knee [de-identified] : leaning on her knee [de-identified] : 3/23/23 [de-identified] : [facility] [de-identified] : xray

## 2023-04-13 NOTE — PHYSICAL EXAM
[Left] : left knee [] : patient ambulates without assistive device [FreeTextEntry3] : Healing abrasion, no signs of infection [TWNoteComboBox7] : flexion 125 degrees [de-identified] : extension 0 degrees

## 2023-04-13 NOTE — IMAGING
[Left] : left knee [All Views] : anteroposterior, lateral, skyline, and anteroposterior standing [FreeTextEntry9] : small area of lucency MFC, ?fracture

## 2023-04-27 ENCOUNTER — APPOINTMENT (OUTPATIENT)
Dept: ORTHOPEDIC SURGERY | Facility: CLINIC | Age: 48
End: 2023-04-27
Payer: COMMERCIAL

## 2023-04-27 ENCOUNTER — EMERGENCY (EMERGENCY)
Facility: HOSPITAL | Age: 48
LOS: 1 days | Discharge: HOME | End: 2023-04-27
Attending: EMERGENCY MEDICINE | Admitting: EMERGENCY MEDICINE
Payer: COMMERCIAL

## 2023-04-27 VITALS — WEIGHT: 168 LBS | BODY MASS INDEX: 27.65 KG/M2 | HEIGHT: 65.5 IN

## 2023-04-27 VITALS
RESPIRATION RATE: 16 BRPM | WEIGHT: 169.09 LBS | HEIGHT: 65 IN | TEMPERATURE: 97 F | OXYGEN SATURATION: 100 % | HEART RATE: 65 BPM | SYSTOLIC BLOOD PRESSURE: 153 MMHG | DIASTOLIC BLOOD PRESSURE: 93 MMHG

## 2023-04-27 DIAGNOSIS — Z90.49 ACQUIRED ABSENCE OF OTHER SPECIFIED PARTS OF DIGESTIVE TRACT: Chronic | ICD-10-CM

## 2023-04-27 DIAGNOSIS — Z98.89 OTHER SPECIFIED POSTPROCEDURAL STATES: Chronic | ICD-10-CM

## 2023-04-27 LAB
ALBUMIN SERPL ELPH-MCNC: 3.7 G/DL — SIGNIFICANT CHANGE UP (ref 3.3–5)
ALP SERPL-CCNC: 76 U/L — SIGNIFICANT CHANGE UP (ref 40–120)
ALT FLD-CCNC: 24 U/L — SIGNIFICANT CHANGE UP (ref 12–78)
ANION GAP SERPL CALC-SCNC: 2 MMOL/L — LOW (ref 5–17)
AST SERPL-CCNC: 14 U/L — LOW (ref 15–37)
BASOPHILS # BLD AUTO: 0.03 K/UL — SIGNIFICANT CHANGE UP (ref 0–0.2)
BASOPHILS NFR BLD AUTO: 0.5 % — SIGNIFICANT CHANGE UP (ref 0–2)
BILIRUB SERPL-MCNC: 0.5 MG/DL — SIGNIFICANT CHANGE UP (ref 0.2–1.2)
BUN SERPL-MCNC: 12 MG/DL — SIGNIFICANT CHANGE UP (ref 7–23)
CALCIUM SERPL-MCNC: 9.4 MG/DL — SIGNIFICANT CHANGE UP (ref 8.5–10.1)
CHLORIDE SERPL-SCNC: 109 MMOL/L — HIGH (ref 96–108)
CO2 SERPL-SCNC: 29 MMOL/L — SIGNIFICANT CHANGE UP (ref 22–31)
CREAT SERPL-MCNC: 0.61 MG/DL — SIGNIFICANT CHANGE UP (ref 0.5–1.3)
EGFR: 112 ML/MIN/1.73M2 — SIGNIFICANT CHANGE UP
EOSINOPHIL # BLD AUTO: 0.16 K/UL — SIGNIFICANT CHANGE UP (ref 0–0.5)
EOSINOPHIL NFR BLD AUTO: 2.5 % — SIGNIFICANT CHANGE UP (ref 0–6)
FLUAV AG NPH QL: SIGNIFICANT CHANGE UP
FLUBV AG NPH QL: SIGNIFICANT CHANGE UP
GLUCOSE SERPL-MCNC: 117 MG/DL — HIGH (ref 70–99)
HCG SERPL-ACNC: 1 MIU/ML — SIGNIFICANT CHANGE UP
HCT VFR BLD CALC: 31.9 % — LOW (ref 34.5–45)
HGB BLD-MCNC: 10.5 G/DL — LOW (ref 11.5–15.5)
IMM GRANULOCYTES NFR BLD AUTO: 0.3 % — SIGNIFICANT CHANGE UP (ref 0–0.9)
LYMPHOCYTES # BLD AUTO: 1.09 K/UL — SIGNIFICANT CHANGE UP (ref 1–3.3)
LYMPHOCYTES # BLD AUTO: 16.9 % — SIGNIFICANT CHANGE UP (ref 13–44)
MCHC RBC-ENTMCNC: 29.4 PG — SIGNIFICANT CHANGE UP (ref 27–34)
MCHC RBC-ENTMCNC: 32.9 GM/DL — SIGNIFICANT CHANGE UP (ref 32–36)
MCV RBC AUTO: 89.4 FL — SIGNIFICANT CHANGE UP (ref 80–100)
MONOCYTES # BLD AUTO: 0.53 K/UL — SIGNIFICANT CHANGE UP (ref 0–0.9)
MONOCYTES NFR BLD AUTO: 8.2 % — SIGNIFICANT CHANGE UP (ref 2–14)
NEUTROPHILS # BLD AUTO: 4.62 K/UL — SIGNIFICANT CHANGE UP (ref 1.8–7.4)
NEUTROPHILS NFR BLD AUTO: 71.6 % — SIGNIFICANT CHANGE UP (ref 43–77)
NRBC # BLD: 0 /100 WBCS — SIGNIFICANT CHANGE UP (ref 0–0)
PLATELET # BLD AUTO: 187 K/UL — SIGNIFICANT CHANGE UP (ref 150–400)
POTASSIUM SERPL-MCNC: 3.9 MMOL/L — SIGNIFICANT CHANGE UP (ref 3.5–5.3)
POTASSIUM SERPL-SCNC: 3.9 MMOL/L — SIGNIFICANT CHANGE UP (ref 3.5–5.3)
PROT SERPL-MCNC: 7.3 G/DL — SIGNIFICANT CHANGE UP (ref 6–8.3)
RBC # BLD: 3.57 M/UL — LOW (ref 3.8–5.2)
RBC # FLD: 13.5 % — SIGNIFICANT CHANGE UP (ref 10.3–14.5)
RSV RNA NPH QL NAA+NON-PROBE: SIGNIFICANT CHANGE UP
SARS-COV-2 RNA SPEC QL NAA+PROBE: SIGNIFICANT CHANGE UP
SODIUM SERPL-SCNC: 140 MMOL/L — SIGNIFICANT CHANGE UP (ref 135–145)
WBC # BLD: 6.45 K/UL — SIGNIFICANT CHANGE UP (ref 3.8–10.5)
WBC # FLD AUTO: 6.45 K/UL — SIGNIFICANT CHANGE UP (ref 3.8–10.5)

## 2023-04-27 PROCEDURE — 93005 ELECTROCARDIOGRAM TRACING: CPT

## 2023-04-27 PROCEDURE — 70450 CT HEAD/BRAIN W/O DYE: CPT | Mod: MA

## 2023-04-27 PROCEDURE — 70496 CT ANGIOGRAPHY HEAD: CPT | Mod: MA

## 2023-04-27 PROCEDURE — 99285 EMERGENCY DEPT VISIT HI MDM: CPT | Mod: 25

## 2023-04-27 PROCEDURE — 99285 EMERGENCY DEPT VISIT HI MDM: CPT

## 2023-04-27 PROCEDURE — 70498 CT ANGIOGRAPHY NECK: CPT | Mod: MA

## 2023-04-27 PROCEDURE — 99214 OFFICE O/P EST MOD 30 MIN: CPT

## 2023-04-27 PROCEDURE — 85025 COMPLETE CBC W/AUTO DIFF WBC: CPT

## 2023-04-27 PROCEDURE — 80053 COMPREHEN METABOLIC PANEL: CPT

## 2023-04-27 PROCEDURE — 87637 SARSCOV2&INF A&B&RSV AMP PRB: CPT

## 2023-04-27 PROCEDURE — 70496 CT ANGIOGRAPHY HEAD: CPT | Mod: 26,MA

## 2023-04-27 PROCEDURE — 84702 CHORIONIC GONADOTROPIN TEST: CPT

## 2023-04-27 PROCEDURE — 93010 ELECTROCARDIOGRAM REPORT: CPT

## 2023-04-27 PROCEDURE — 36415 COLL VENOUS BLD VENIPUNCTURE: CPT

## 2023-04-27 PROCEDURE — 70498 CT ANGIOGRAPHY NECK: CPT | Mod: 26,MA

## 2023-04-27 RX ORDER — DICLOFENAC SODIUM 1% 10 MG/G
1 GEL TOPICAL DAILY
Qty: 1 | Refills: 1 | Status: ACTIVE | COMMUNITY
Start: 2023-04-27 | End: 1900-01-01

## 2023-04-27 RX ORDER — KETOROLAC TROMETHAMINE 30 MG/ML
30 SYRINGE (ML) INJECTION ONCE
Refills: 0 | Status: COMPLETED | OUTPATIENT
Start: 2023-04-27 | End: 2023-04-27

## 2023-04-27 RX ORDER — SODIUM CHLORIDE 9 MG/ML
1000 INJECTION INTRAMUSCULAR; INTRAVENOUS; SUBCUTANEOUS
Refills: 0 | Status: DISCONTINUED | OUTPATIENT
Start: 2023-04-27 | End: 2023-05-01

## 2023-04-27 RX ORDER — METOCLOPRAMIDE HCL 10 MG
10 TABLET ORAL ONCE
Refills: 0 | Status: COMPLETED | OUTPATIENT
Start: 2023-04-27 | End: 2023-04-27

## 2023-04-27 RX ADMIN — SODIUM CHLORIDE 1000 MILLILITER(S): 9 INJECTION INTRAMUSCULAR; INTRAVENOUS; SUBCUTANEOUS at 19:54

## 2023-04-27 RX ADMIN — Medication 10 MILLIGRAM(S): at 19:54

## 2023-04-27 NOTE — ED PROVIDER NOTE - CLINICAL SUMMARY MEDICAL DECISION MAKING FREE TEXT BOX
This patient is a 47-year-old female with a history of migraine headaches and anemia.  Patient states she normally gets menstrual migraines however patient states approximately 2 weeks ago she had sudden onset of bitemporal headache that came on at maximum in severity with no fever chills nausea vomiting but was severe and lasted for few hours and then resolved patient states the headache returned in a similar fashion 2 days later and lasted all day long and she slept through the headache and in the morning it was resolved however patient has similar symptoms yesterday and now today and presents with a moderate headache at this time no fevers no chills no photophobia or phonophobia and denies numbness weakness or any neurologic symptoms but presents for evaluation.  Patient on evaluation with normal neurologic exam no meningismus.  Patient appears in no apparent distress.  However due to the sudden onset of pain and the duration patient should be scan to rule out vascular anomaly or intracranial bleeding.  We will treat patient for migraine in the interim and if symptoms resolved and CT is normal we will discharge the patient for follow-up with neurology

## 2023-04-27 NOTE — PHYSICAL EXAM
[Left] : left knee [] : patient ambulates without assistive device [FreeTextEntry3] : Healing abrasion, no signs of infection [TWNoteComboBox7] : flexion 125 degrees [de-identified] : extension 0 degrees

## 2023-04-27 NOTE — DISCUSSION/SUMMARY
[de-identified] : 47f s/p contusion of the left knee, without meniscal or ligament tear on MRI. \par 1) diclofenac gel rx\par 2) activity modification\par 3) cryotherapy\par 4) home exercises provided\par 5) rtc prn\par \par Entered by Anna Logan acting as scribe.\par Dr. Crocker-\par The documentation recorded by the scribe accurately reflects the service I personally performed and the decisions made by me.

## 2023-04-27 NOTE — ED ADULT TRIAGE NOTE - CHIEF COMPLAINT QUOTE
Pt states she has been having a migraine headache for a week. Pt states she was sent over by urgent care for a BP of 160/110. Pt denies N, V, facial numbness, extremity numbness blurred vision and double vison.

## 2023-04-27 NOTE — ED PROVIDER NOTE - CARE PROVIDER_API CALL
Toy Nova  NEUROLOGY  [address] [place] [address]  Phone: [phone]  Fax: [phone]  Follow Up Time: 7-10 Days

## 2023-04-27 NOTE — ED ADULT NURSE REASSESSMENT NOTE - NS ED NURSE REASSESS COMMENT FT1
Assumed care of Pt from previous RN, c/o migraine since yesterday, took normal medication with no relief, IV medications infusing, awaiting CT scan, resting comfortably on stretcher, will continue to monitor closely.

## 2023-04-27 NOTE — ED PROVIDER NOTE - OBJECTIVE STATEMENT
This patient is a 47-year-old female with a history of migraine headaches and anemia.  Patient states she normally gets menstrual migraines however patient states approximately 2 weeks ago she had sudden onset of bitemporal headache that came on at maximum in severity with no fever chills nausea vomiting but was severe and lasted for few hours and then resolved patient states the headache returned in a similar fashion 2 days later and lasted all day long and she slept through the headache and in the morning it was resolved however patient has similar symptoms yesterday and now today and presents with a moderate headache at this time no fevers no chills no photophobia or phonophobia and denies numbness weakness or any neurologic symptoms but presents for evaluation

## 2023-04-27 NOTE — HISTORY OF PRESENT ILLNESS
[Sudden] : sudden [7] : 7 [0] : 0 [Sharp] : sharp [Occasional] : occasional [Sleep] : sleep [Full time] : Work status: full time [de-identified] : 4/27/23: Here to f/up left knee and review MRI results. \par 04/13/2023 Ms. COMFORT JACOBS, a 47 year old female, presents today for left knee pain. Reports hx of a fall about 1 month ago with persistent pain. \par  [] : no [FreeTextEntry5] : Pt still feeling pain when putting pressure on the knee. No Improvement.  [FreeTextEntry1] : Left knee [de-identified] : leaning on her knee [de-identified] : 3/23/23 [de-identified] : [facility] [de-identified] : xray

## 2023-04-27 NOTE — ED PROVIDER NOTE - NSFOLLOWUPINSTRUCTIONS_ED_ALL_ED_FT
Return to the emergency room for fever severe pain or worsening symptoms.  Take Motrin 600 mg every 6 hours with food as needed for headache at the initial onset.  Call neurology to arrange follow-up within 1 week for outpatient evaluation

## 2023-04-27 NOTE — DATA REVIEWED
[MRI] : MRI [Left] : left [Knee] : knee [Report was reviewed and noted in the chart] : The report was reviewed and noted in the chart [I reviewed the films/CD] : I reviewed the films/CD [FreeTextEntry1] : 04.20.23\par 1. Low-grade medial patellar articular cartilaginous fissure\par 2. Mild suprapatellar effusion. Mild prepatellar/patellar superficial soft tissue edema.\par 3. Moderate generalized heterogeneous variant cellular marrow changes within the distal femur and proximal tibia .

## 2023-04-27 NOTE — ED ADULT NURSE NOTE - OBJECTIVE STATEMENT
pt ambulatory from triage, a&ox4, c/o migraine HA x1 week, evaluated and sent from UC for elevated BP + HA. pt endorses taking imitrex with no relief, UC gave 1 dose clonidine. pt denies dizziness/blurry vision/ringing ears/numbness/tingling/N/V/D/CP/palpitations/SOB.

## 2023-04-27 NOTE — ED PROVIDER NOTE - PROGRESS NOTE DETAILS
Patient improved with no headache CT and CTA are negative we will discharge the patient to follow-up with neuro as an outpatient

## 2023-04-27 NOTE — ED ADULT NURSE REASSESSMENT NOTE - GLASGOW COMA SCALE
baseline
Gen: Alert, rocking, well appearing  Head: NC, AT, PERRL, EOMI, normal lids/conjunctiva  ENT: normal hearing, patent oropharynx   Neck: +supple, no tenderness/meningismus,  Pulm: Bilateral BS, normal resp effort, no wheeze/stridor/retractions  CV: RRR, no murmer  Abd: soft, LLQ tenderness. No CVA tenderness  Mskel: no edema/erythema/cyanosis  Skin: no rash, warm/dry  Neuro: AAOx3, no sensory/motor deficits

## 2023-04-27 NOTE — ED PROVIDER NOTE - PATIENT PORTAL LINK FT
You can access the FollowMyHealth Patient Portal offered by [facility] by registering at the following website: [email or web address]. By joining [facility]’s FollowMyHealth portal, you will also be able to view your health information using other applications (apps) compatible with our system.

## 2023-04-27 NOTE — ED ADULT TRIAGE NOTE - RESPIRATORY RATE (BREATHS/MIN)
Copper urine test is still in progress. We send this out for testing so I do not have an ETA. My guess is that we should get results by the end of next week.     AMOR Fleming, CNP     16

## 2023-04-28 VITALS
TEMPERATURE: 98 F | HEART RATE: 57 BPM | OXYGEN SATURATION: 99 % | RESPIRATION RATE: 18 BRPM | DIASTOLIC BLOOD PRESSURE: 82 MMHG | SYSTOLIC BLOOD PRESSURE: 125 MMHG

## 2023-05-04 ENCOUNTER — APPOINTMENT (OUTPATIENT)
Dept: ORTHOPEDIC SURGERY | Facility: CLINIC | Age: 48
End: 2023-05-04
Payer: COMMERCIAL

## 2023-05-04 VITALS — WEIGHT: 168 LBS | HEIGHT: 65.5 IN | BODY MASS INDEX: 27.65 KG/M2

## 2023-05-04 DIAGNOSIS — S80.02XA CONTUSION OF LEFT KNEE, INITIAL ENCOUNTER: ICD-10-CM

## 2023-05-04 PROCEDURE — 99213 OFFICE O/P EST LOW 20 MIN: CPT

## 2023-05-04 NOTE — DISCUSSION/SUMMARY
[de-identified] : 47f s/p contusion of the left knee, without meniscal or ligament tear on MRI. \par 1) c/w  diclofenac gel rx\par 2) activity modification\par 3) cryotherapy\par 4) home exercises provided\par 5) rtc prn\par \par Entered by Anna Logan acting as scribe.\par Dr. Crocker-\par The documentation recorded by the scribe accurately reflects the service I personally performed and the decisions made by me.

## 2023-05-04 NOTE — PHYSICAL EXAM
[Left] : left knee [] : no atrophy [TWNoteComboBox7] : flexion 125 degrees [de-identified] : extension 0 degrees

## 2023-05-04 NOTE — HISTORY OF PRESENT ILLNESS
[Sudden] : sudden [4] : 4 [0] : 0 [Sharp] : sharp [Occasional] : occasional [Sleep] : sleep [Full time] : Work status: full time [de-identified] : 5/4/23: Here to f/up left knee. Concerned over "clicking" of the left knee. Doing well with HEP and diclofenac gel. \par 4/27/23: Here to f/up left knee and review MRI results. \par 04/13/2023 Ms. COMFORT JACOBS, a 47 year old female, presents today for left knee pain. Reports hx of a fall about 1 month ago with persistent pain. \par  [] : no [FreeTextEntry1] : Left knee [FreeTextEntry5] : Pt feeling a clicking on the left knee every time she's walking. No pain. Pt not feeling improvement. Pt using cream o her left knee which is helping. \par Pt still feeling pain when putting pressure on the knee. No Improvement.  [de-identified] : leaning on her knee [de-identified] : 3/23/23 [de-identified] : [facility] [de-identified] : xray

## 2023-08-08 NOTE — ED PROVIDER NOTE - NSICDXPASTMEDICALHX_GEN_ALL_CORE_FT
c5 injury; c collar; cleared for d/c    I, Yan Manzano, participated in the care of this patient with the ACP. I discussed the history and physical exam findings as well as lab results and plan of care with the ACP. I agree with ACP's history, physical and assessment. PAST MEDICAL HISTORY:  No pertinent past medical history

## 2023-08-16 ENCOUNTER — APPOINTMENT (OUTPATIENT)
Dept: OBGYN | Facility: CLINIC | Age: 48
End: 2023-08-16

## 2023-09-11 NOTE — ED PROVIDER NOTE - PRO INTERPRETER NEED 2
English Retention Suture Text: Retention sutures were placed to support the closure and prevent dehiscence. none required

## 2023-11-03 NOTE — ED PROVIDER NOTE - CAPILLARY REFILL
less than 2 seconds

## 2023-11-07 ENCOUNTER — APPOINTMENT (OUTPATIENT)
Dept: ORTHOPEDIC SURGERY | Facility: CLINIC | Age: 48
End: 2023-11-07

## 2023-11-07 NOTE — ED ADULT NURSE NOTE - NS ED NURSE RECORD ANOTHER VITAL SIGN
Home.  Xrays and CT did not show any acute injury.  Ice and rest to help with swelling and bruising.  Tylenol for pain as needed.  Head injury instructions.  See MD for a recheck in a week to review your symptoms and also review your CT results.  Return if any concerns.    Results for orders placed or performed during the hospital encounter of 11/06/23   XR Knee Bilateral 1/2 Views     Status: None    Narrative    EXAM: XR KNEE BILATERAL 1/2 VIEWS  LOCATION: [facility]  DATE: 11/6/2023    INDICATION: trauma fall  COMPARISON: None.      Impression    IMPRESSION:   RIGHT KNEE: Normal joint spaces and alignment. No fracture or joint effusion.    LEFT KNEE: Normal joint spaces and alignment. No fracture or joint effusion.   XR Chest 2 Views     Status: None    Narrative    EXAM: XR CHEST 2 VIEWS  LOCATION: [facility]  DATE: 11/6/2023    INDICATION: trauma fall chest injury  COMPARISON: None.      Impression    IMPRESSION: Negative chest.   CT Head w/o Contrast     Status: None    Narrative    EXAM: CT HEAD W/O CONTRAST, CT CERVICAL SPINE W/O CONTRAST  LOCATION: [facility]  DATE: 11/6/2023    INDICATION: fall with forehead trauma with altered mental status  COMPARISON: None.  TECHNIQUE:   1) Routine CT Head without IV contrast. Multiplanar reformats. Dose reduction techniques were used.  2) Routine CT Cervical Spine without IV contrast. Multiplanar reformats. Dose reduction techniques were used.    FINDINGS:   HEAD CT:   INTRACRANIAL CONTENTS: No intracranial hemorrhage, extraaxial collection, or mass effect.  No CT evidence of acute infarct. Minimal low attenuation periventricular white matter lateral ventricles suggestive of gliosis from prior insult or possible   prominent perivascular spaces.  Normal ventricles and sulci. Empty sella and possible cerebellar tonsillar ectopia of  6mm.    VISUALIZED ORBITS/SINUSES/MASTOIDS: No intraorbital abnormality. No paranasal sinus mucosal disease. No middle ear or mastoid effusion.    BONES/SOFT TISSUES: Calvarium intact with tiny scalp hematoma right frontal region.    CERVICAL SPINE CT:   VERTEBRA: Normal vertebral body heights and alignment. No fracture or posttraumatic subluxation.     CANAL/FORAMINA: No canal or neural foraminal stenosis.    PARASPINAL: No extraspinal abnormality. Visualized lung fields are clear.      Impression    IMPRESSION:  HEAD CT:  1.  No CT evidence of  mass lesion, hemorrhage or focal area suggestive of acute infarct.  2.  Empty sella.  3.  Possible cerebellar tonsillar ectopia of 6mm.  4.  Recommend MR cervical spine in future for further evaluation.    CERVICAL SPINE CT:  1.  No CT evidence for acute fracture or post traumatic subluxation.  2.  No significant canal compromise or neuroforaminal narrowing throughout cervical spine.   Cervical spine CT w/o contrast     Status: None    Narrative    EXAM: CT HEAD W/O CONTRAST, CT CERVICAL SPINE W/O CONTRAST  LOCATION: [facility]  DATE: 11/6/2023    INDICATION: fall with forehead trauma with altered mental status  COMPARISON: None.  TECHNIQUE:   1) Routine CT Head without IV contrast. Multiplanar reformats. Dose reduction techniques were used.  2) Routine CT Cervical Spine without IV contrast. Multiplanar reformats. Dose reduction techniques were used.    FINDINGS:   HEAD CT:   INTRACRANIAL CONTENTS: No intracranial hemorrhage, extraaxial collection, or mass effect.  No CT evidence of acute infarct. Minimal low attenuation periventricular white matter lateral ventricles suggestive of gliosis from prior insult or possible   prominent perivascular spaces.  Normal ventricles and sulci. Empty sella and possible cerebellar tonsillar ectopia of 6mm.    VISUALIZED ORBITS/SINUSES/MASTOIDS: No intraorbital abnormality. No paranasal sinus  mucosal disease. No middle ear or mastoid effusion.    BONES/SOFT TISSUES: Calvarium intact with tiny scalp hematoma right frontal region.    CERVICAL SPINE CT:   VERTEBRA: Normal vertebral body heights and alignment. No fracture or posttraumatic subluxation.     CANAL/FORAMINA: No canal or neural foraminal stenosis.    PARASPINAL: No extraspinal abnormality. Visualized lung fields are clear.      Impression    IMPRESSION:  HEAD CT:  1.  No CT evidence of  mass lesion, hemorrhage or focal area suggestive of acute infarct.  2.  Empty sella.  3.  Possible cerebellar tonsillar ectopia of 6mm.  4.  Recommend MR cervical spine in future for further evaluation.    CERVICAL SPINE CT:  1.  No CT evidence for acute fracture or post traumatic subluxation.  2.  No significant canal compromise or neuroforaminal narrowing throughout cervical spine.   hCG qual urine POCT     Status: Normal   Result Value Ref Range    HCG Qual Urine Negative Negative    Internal QC Check POCT Valid Valid    POCT Kit Lot Number [id number]     POCT Kit Expiration Date 5/29/25           Yes

## 2023-12-18 NOTE — ED PROVIDER NOTE - DISPOSITION TYPE
Reminder message sent to patient to complete labs prior to 12/26/2023 appointmentt with Dr. Lyons.  Orders are in EPIC.    
DISCHARGE

## 2024-03-22 NOTE — ED PROVIDER NOTE - ATTENDING APP SHARED VISIT CONTRIBUTION OF CARE
HISTORY:    Patient came in today for an urgent visit but wanted a general exam as well.      1. For the past two weeks she has been noticing rapid heartbeat, typically over 100, often in the 110-120 range.  Very limited exertion will causes, sometimes standing up from a chair, walking across the room will produce a heart rate in the 120s or 130s.  This has been accompanied with some shortness of breath with exertion.  Initially she had some left-sided chest pain which was pleuritic.  Denies any recent travel.  She has some chronic pain in the left knee and occasionally pain in the left calf although not acute.  She does not smoke.  She is not on birth control pills.  She does take Vyvanse for at least the past year, but has never had issues with tachycardia in the past.    2. Some bilateral wrist pain, worse on the right than the left associated with some tingling.      3. She has been complaining of some pain in the left anterior knee which sometimes radiates down into the shin.  It is worse if she has been sitting cross-legged it for awhile.      4. Depression with ADHD.  She had requested Wellbutrin last visit but she has a prior history of seizure on that medication.  We have kept her on sertraline.    Patient Active Problem List    Diagnosis Date Noted    Psoriasis 04/04/2023     Priority: Low    Attention deficit hyperactivity disorder (ADHD), combined type 11/09/2022     Priority: Low    Depression      Priority: Low     Past Medical History:   Diagnosis Date    Concussion 10/2022    COVID-19 08/2022    Depression     Seizure (CMD) 02/24/2017     History reviewed. No pertinent surgical history.  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     Comment: [place] [employer]     Occupation: Dog walking/boarding   Tobacco Use    Smoking status: Never    Smokeless tobacco: Never   Vaping Use    Vaping Use: never used   Substance and Sexual Activity    Alcohol use: Yes     Alcohol/week: 0.0 standard drinks of alcohol     Comment: on the weekends    Drug use: No    Sexual activity: Yes     Partners: Male   Other Topics Concern     Service Yes     Comment: [employer] 9-10 months, 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     Social Determinants of Health     Financial Resource Strain: Not on file   Food Insecurity: Not on file   Transportation Needs: Not on file   Physical Activity: Not on file   Stress: Not on file   Social Connections: Not on file   Interpersonal Safety: Not on file     Family History   Problem Relation Age of Onset    Thyroid Mother     Depression Mother     Depression Father     Thyroid Maternal Grandmother     Heart disease Paternal Grandmother     Thyroid Maternal Aunt     Cancer Paternal Aunt         cervical           The remainder of the review of systems was otherwise negative.    Past Medical, Surgical, Family Histories, Medication list and Allergies reviewed and accepted.  Medical Assistant's notes reviewed and accepted.    Problem list reviewed and accepted.    PHYSICAL EXAMINATION:    Vitals:    03/22/24 1105 03/22/24 1107 03/22/24 1131   BP: (!) 140/90 (!) 140/90    BP Location: LUE - Left upper extremity LUE - Left upper extremity    Patient Position: Sitting Sitting    Cuff Size: Regular Regular    Pulse: (!) 105  (!) 135   Resp: 18     Temp: 98.1 °F (36.7 °C)      TempSrc: Temporal     SpO2:   96%   Weight: 82.2 kg (181 lb 4.8 oz)     Height: 5' 4\" (1.626 m)     LMP: 02/13/2023         PHYSICAL EXAM:    General:  Alert, in no acute distress    Skin:  Warm with normal turgor    Head:  Atraumatic and normocephalic    Eyes:  Eyelids and conjunctivae appear normal, no excessive tearing or discharge    Nose:  No flaring or discharge present    Throat:  Oropharynx is clear, no redness or exudate present     Neck:  She has a left posterior neck nodular area which feels like a lymph node    Lungs:  Clear to auscultation, no wheezing or rhonchi noted    Heart:  Rapid but regular rhythm, no murmur present    Abdomen:  Soft, no guarding or masses, no organomegaly or costovertebral angle tenderness    Extremities:  Full ROM, with normal appearing joints, no obvious calf swelling     Neurologic:  No focal deficits, gait disturbance        IMPRESSION:    General medical exam     - D Dimer, Quantitative; Future  - Thyroid Stimulating Hormone Reflex; Future  - Comprehensive Metabolic Panel; Future  - CBC with Automated Differential; Future  - Vitamin D -25 Hydroxy; Future    Rapid heart beat  With episode of chest pain, concern would be for subacute pulmonary embolism.  Will arrange for a stat CT and a D-dimer to be performed.  If negative, other causes including hyperthyroidism, medication effect from her stimulant, or acute infection need to be evaluated as well.    - D Dimer, Quantitative; Future  - Electrocardiogram 12-Lead; Future  - CTA CHEST PULMONARY EMBOLISM; Future    Attention deficit hyperactivity disorder (ADHD), combined type  Depending on results of above studies, may need to consider coming down from her dose of Vyvanse.    Other depression  Would continue the sertraline for now.    Shortness of breath     - D Dimer, Quantitative; Future  - CTA CHEST PULMONARY EMBOLISM; Future    Pain in both wrists  Discussed that this sounds like carpal tunnel syndrome and would recommend  wrist splints at night.    Patellofemoral pain syndrome of left knee  Given information on patellofemoral syndrome.       Patient is a previously healthy 46-year-old female with no significant past medical or surgical history other than thalassemia trait with anemia status post multiple blood transfusions.  Primary care physician is Dr. Lawrence.  She denies pregnancy.  Not a smoker, not a drinker.  She states she is allergic to codeine when used it required her to have her stomach pumped.  She presents emergency room today with a complaint of nausea and vomiting for the 2 days now better, but now with with watery diarrhea and diffuse abdominal pain.  Pain is nonradiating to the back, not associated with urinary symptoms, not associate with chest pain or shortness of breath.  No fever, no ill contacts, no travel, no raw or unusual foods.    On evaluation is a well-developed well-nourished female no apparent distress HEENT is normal, neck is supple cardiac and lung exam is normal.  Abdominal exam patient has diffuse abdominal tenderness belly is soft to palpation, there is no CVA tenderness, normal borborygmi.  Musculoskeletal exam is normal.  Skin exam is normal.    Plan 46-year-old previously healthy female with diffuse abdominal pain and tenderness with nausea and vomiting and watery diarrhea.  Rule out infectious diarrhea rule out ischemia, treat the pain, rule out other causes for symptoms, labs including CBC chemistry to check electrolytes.  CT scan, IV pain medication.  Patient is refusing IV narcotics will alleviate some of the symptoms with IV Tylenol.

## 2024-04-19 NOTE — ED ADULT NURSE REASSESSMENT NOTE - NEURO MENTATION
No care due was identified.  Health [facility] Embedded Care Due Messages. Reference number: [id number].   4/18/2024 7:50:25 PM CDT   normal

## 2024-09-20 NOTE — PATIENT PROFILE OB - PRO PRENATAL LABS ORI SOURCE BLOOD TYPE
well developed, well nourished , in no acute distress , ambulating without difficulty , normal communication ability hard copy